# Patient Record
Sex: MALE | Race: OTHER
[De-identification: names, ages, dates, MRNs, and addresses within clinical notes are randomized per-mention and may not be internally consistent; named-entity substitution may affect disease eponyms.]

---

## 2020-10-18 ENCOUNTER — HOSPITAL ENCOUNTER (INPATIENT)
Dept: HOSPITAL 72 - EMR | Age: 35
LOS: 2 days | Discharge: NURSING, REHAB OR LONG-TERM CARE | DRG: 137 | End: 2020-10-20
Payer: MEDICAID

## 2020-10-18 VITALS — DIASTOLIC BLOOD PRESSURE: 90 MMHG | SYSTOLIC BLOOD PRESSURE: 140 MMHG

## 2020-10-18 VITALS — SYSTOLIC BLOOD PRESSURE: 143 MMHG | DIASTOLIC BLOOD PRESSURE: 79 MMHG

## 2020-10-18 VITALS — DIASTOLIC BLOOD PRESSURE: 88 MMHG | SYSTOLIC BLOOD PRESSURE: 142 MMHG

## 2020-10-18 VITALS — WEIGHT: 193.79 LBS | BODY MASS INDEX: 31.14 KG/M2 | HEIGHT: 66 IN

## 2020-10-18 VITALS — DIASTOLIC BLOOD PRESSURE: 85 MMHG | SYSTOLIC BLOOD PRESSURE: 133 MMHG

## 2020-10-18 VITALS — DIASTOLIC BLOOD PRESSURE: 89 MMHG | SYSTOLIC BLOOD PRESSURE: 142 MMHG

## 2020-10-18 DIAGNOSIS — U07.1: Primary | ICD-10-CM

## 2020-10-18 DIAGNOSIS — J12.89: ICD-10-CM

## 2020-10-18 DIAGNOSIS — E11.9: ICD-10-CM

## 2020-10-18 DIAGNOSIS — R74.01: ICD-10-CM

## 2020-10-18 DIAGNOSIS — D72.810: ICD-10-CM

## 2020-10-18 DIAGNOSIS — K21.9: ICD-10-CM

## 2020-10-18 DIAGNOSIS — K76.0: ICD-10-CM

## 2020-10-18 LAB
ADD MANUAL DIFF: NO
ALBUMIN SERPL-MCNC: 2.5 G/DL (ref 3.4–5)
ALBUMIN/GLOB SERPL: 0.5 {RATIO} (ref 1–2.7)
ALP SERPL-CCNC: 220 U/L (ref 46–116)
ALT SERPL-CCNC: 204 U/L (ref 12–78)
ANION GAP SERPL CALC-SCNC: 7 MMOL/L (ref 5–15)
APTT BLD: 28 SEC (ref 23–33)
AST SERPL-CCNC: 272 U/L (ref 15–37)
BASOPHILS NFR BLD AUTO: 1.1 % (ref 0–2)
BILIRUB DIRECT SERPL-MCNC: 0.5 MG/DL (ref 0–0.3)
BILIRUB SERPL-MCNC: 1.4 MG/DL (ref 0.2–1)
BUN SERPL-MCNC: 6 MG/DL (ref 7–18)
CALCIUM SERPL-MCNC: 7.7 MG/DL (ref 8.5–10.1)
CHLORIDE SERPL-SCNC: 104 MMOL/L (ref 98–107)
CO2 SERPL-SCNC: 24 MMOL/L (ref 21–32)
CREAT SERPL-MCNC: 0.8 MG/DL (ref 0.55–1.3)
EOSINOPHIL NFR BLD AUTO: 0.4 % (ref 0–3)
ERYTHROCYTE [DISTWIDTH] IN BLOOD BY AUTOMATED COUNT: 12.6 % (ref 11.6–14.8)
FERRITIN SERPL-MCNC: 420 NG/ML (ref 8–388)
GLOBULIN SER-MCNC: 5.1 G/DL
HCT VFR BLD CALC: 42.1 % (ref 42–52)
HGB BLD-MCNC: 15.2 G/DL (ref 14.2–18)
INR PPP: 1.3 (ref 0.9–1.1)
LDH SERPL L TO P-CCNC: 699 U/L (ref 81–234)
LYMPHOCYTES NFR BLD AUTO: 17.7 % (ref 20–45)
MCV RBC AUTO: 95 FL (ref 80–99)
MONOCYTES NFR BLD AUTO: 9.8 % (ref 1–10)
NEUTROPHILS NFR BLD AUTO: 71 % (ref 45–75)
PLATELET # BLD: 121 K/UL (ref 150–450)
POTASSIUM SERPL-SCNC: 3.7 MMOL/L (ref 3.5–5.1)
RBC # BLD AUTO: 4.45 M/UL (ref 4.7–6.1)
SODIUM SERPL-SCNC: 135 MMOL/L (ref 136–145)
WBC # BLD AUTO: 10.7 K/UL (ref 4.8–10.8)

## 2020-10-18 PROCEDURE — 85379 FIBRIN DEGRADATION QUANT: CPT

## 2020-10-18 PROCEDURE — 87040 BLOOD CULTURE FOR BACTERIA: CPT

## 2020-10-18 PROCEDURE — 83036 HEMOGLOBIN GLYCOSYLATED A1C: CPT

## 2020-10-18 PROCEDURE — 86803 HEPATITIS C AB TEST: CPT

## 2020-10-18 PROCEDURE — 36415 COLL VENOUS BLD VENIPUNCTURE: CPT

## 2020-10-18 PROCEDURE — 86709 HEPATITIS A IGM ANTIBODY: CPT

## 2020-10-18 PROCEDURE — 80053 COMPREHEN METABOLIC PANEL: CPT

## 2020-10-18 PROCEDURE — 82728 ASSAY OF FERRITIN: CPT

## 2020-10-18 PROCEDURE — 83605 ASSAY OF LACTIC ACID: CPT

## 2020-10-18 PROCEDURE — 85025 COMPLETE CBC W/AUTO DIFF WBC: CPT

## 2020-10-18 PROCEDURE — 99285 EMERGENCY DEPT VISIT HI MDM: CPT

## 2020-10-18 PROCEDURE — 87340 HEPATITIS B SURFACE AG IA: CPT

## 2020-10-18 PROCEDURE — 83615 LACTATE (LD) (LDH) ENZYME: CPT

## 2020-10-18 PROCEDURE — 82248 BILIRUBIN DIRECT: CPT

## 2020-10-18 PROCEDURE — 86140 C-REACTIVE PROTEIN: CPT

## 2020-10-18 PROCEDURE — 85730 THROMBOPLASTIN TIME PARTIAL: CPT

## 2020-10-18 PROCEDURE — 83690 ASSAY OF LIPASE: CPT

## 2020-10-18 PROCEDURE — 85610 PROTHROMBIN TIME: CPT

## 2020-10-18 PROCEDURE — 86705 HEP B CORE ANTIBODY IGM: CPT

## 2020-10-18 PROCEDURE — 71045 X-RAY EXAM CHEST 1 VIEW: CPT

## 2020-10-18 PROCEDURE — 96368 THER/DIAG CONCURRENT INF: CPT

## 2020-10-18 PROCEDURE — 96372 THER/PROPH/DIAG INJ SC/IM: CPT

## 2020-10-18 PROCEDURE — 76700 US EXAM ABDOM COMPLETE: CPT

## 2020-10-18 PROCEDURE — 96365 THER/PROPH/DIAG IV INF INIT: CPT

## 2020-10-18 PROCEDURE — 80076 HEPATIC FUNCTION PANEL: CPT

## 2020-10-18 PROCEDURE — 96375 TX/PRO/DX INJ NEW DRUG ADDON: CPT

## 2020-10-18 PROCEDURE — 96361 HYDRATE IV INFUSION ADD-ON: CPT

## 2020-10-18 NOTE — EMERGENCY ROOM REPORT
History of Present Illness


General


Chief Complaint:  Upper Respiratory Illness


Source:  Patient





Present Illness


HPI


35-year-old male presents the ED for evaluation.  States that he has been 

coughing for the last 2 weeks.  States he got tested positive for COVID a few 

days ago.  States he has been since then having increased body aches and cough. 

Vomited once.  Denies shortness of breath.  Denies chest pain.  No other 

aggravating relieving factors.  Denies any other associated symptoms


Allergies:  


Coded Allergies:  


     No Known Allergies (Unverified , 10/18/20)





COVID-19 Screening


Contact w/high risk pt:  Yes


Experienced COVID-19 symptoms?:  Yes


COVID-19 Testing performed PTA:  Yes


COVID-19 Screening:  Positive COVID-19


COVID-19 Testing Source:  nasal





Patient History


Past Medical History:  DM


Past Surgical History:  none


Pertinent Family History:  none


Social History:  Denies: smoking, alcohol use, drug use


Immunizations:  UTD


Reviewed Nursing Documentation:  PMH: Agreed; PSxH: Agreed





Nursing Documentation-PMH


Hx Diabetes:  Yes





Review of Systems


All Other Systems:  negative except mentioned in HPI





Physical Exam





Vital Signs








  Date Time  Temp Pulse Resp B/P (MAP) Pulse Ox O2 Delivery O2 Flow Rate FiO2


 


10/18/20 12:13 98.2 94 18 139/70 (93) 98 Room Air  








Sp02 EP Interpretation:  reviewed, normal


General Appearance:  no apparent distress, alert, GCS 15, non-toxic


Head:  normocephalic, atraumatic


Eyes:  bilateral eye normal inspection, bilateral eye PERRL


ENT:  hearing grossly normal, normal pharynx, no angioedema, normal voice


Neck:  full range of motion, supple/symm/no masses


Respiratory:  chest non-tender, normal breath sounds, decreased breath sounds, 

crackles, speaking full sentences


Cardiovascular #1:  regular rate, rhythm, no edema


Cardiovascular #2:  2+ carotid (R), 2+ carotid (L), 2+ radial (R), 2+ radial 

(L), 2+ dorsalis pedis (R), 2+ dorsalis pedis (L)


Gastrointestinal:  normal bowel sounds, non tender, soft, non-distended, no 

guarding, no rebound


Rectal:  deferred


Genitourinary:  normal inspection, no CVA tenderness


Musculoskeletal:  back normal, normal range of motion, gait/station normal, non-

tender


Neurologic:  alert, motor strength/tone normal, oriented x3, sensory intact, 

responsive, speech normal


Psychiatric:  judgement/insight normal, memory normal, mood/affect normal, no 

suicidal/homicidal ideation


Reflexes:  3+ bicep (R), 3+ bicep (L), 3+ tricep (R), 3+ tricep (L), 3+ knee 

(R), 3+ knee (L)


Skin:  no rash


Lymphatic:  no adenopathy





Medical Decision Making


Diagnostic Impression:  


   Primary Impression:  


   Pneumonia due to COVID-19 virus


ER Course


Hospital Course 


35-year-old male presents with cough, vomiting.  Recent COVID diagnosis





Differential diagnoses include: Pneumonia, CHF exacerbation, pneumothorax, fluid

overload





Clinical course


Patient placed on stretcher.  Isolation.  I wore full PPE.  On cardiac monitor 

with stable vitals.  After initial history and physical, I ordered labs, IV 

fluids, 





Labs - no leukocytosis, hemoglobin/hematocrit stable, electrolytes okay, LFTs 

elevated


CXR -acute bilateral opacities consistent with COVID pneumonia





Inflammatory markers pending.  D-dimer elevated





Given antibiotics.  Given Lovenox.





Case discussed with Dr. Gutierrez and he agreed to the patient to his service for 

further care and support





I feel this is a highly complex case requiring extensive working including 

EKG/Rhythm strip, Xray/CT/US, Blood/urine lab work, repeat exams while in ED, 

and administration of strong opiates/narcotics for pain control, admission to 

hospital or close patient follow up.  





Diagnosis - COVID pneumonia





Patient admitted to floor in serious condition





Laboratory Tests








Test


 10/18/20


12:11


 


White Blood Count


 10.7 K/UL


(4.8-10.8)


 


Red Blood Count


 4.45 M/UL


(4.70-6.10)  L


 


Hemoglobin


 15.2 G/DL


(14.2-18.0)


 


Hematocrit


 42.1 %


(42.0-52.0)


 


Mean Corpuscular Volume 95 FL (80-99)  


 


Mean Corpuscular Hemoglobin


 34.1 PG


(27.0-31.0)  H


 


Mean Corpuscular Hemoglobin


Concent 36.0 G/DL


(32.0-36.0)


 


Red Cell Distribution Width


 12.6 %


(11.6-14.8)


 


Platelet Count


 121 K/UL


(150-450)  L


 


Mean Platelet Volume


 10.5 FL


(6.5-10.1)  H


 


Neutrophils (%) (Auto)


 71.0 %


(45.0-75.0)


 


Lymphocytes (%) (Auto)


 17.7 %


(20.0-45.0)  L


 


Monocytes (%) (Auto)


 9.8 %


(1.0-10.0)


 


Eosinophils (%) (Auto)


 0.4 %


(0.0-3.0)


 


Basophils (%) (Auto)


 1.1 %


(0.0-2.0)


 


Sodium Level


 135 MMOL/L


(136-145)  L


 


Potassium Level


 3.7 MMOL/L


(3.5-5.1)


 


Chloride Level


 104 MMOL/L


()


 


Carbon Dioxide Level


 24 MMOL/L


(21-32)


 


Anion Gap


 7 mmol/L


(5-15)


 


Blood Urea Nitrogen


 6 mg/dL (7-18)


L


 


Creatinine


 0.8 MG/DL


(0.55-1.30)


 


Estimat Glomerular Filtration


Rate > 60 mL/min


(>60)


 


Glucose Level


 221 MG/DL


()  H


 


Calcium Level


 7.7 MG/DL


(8.5-10.1)  L


 


Total Bilirubin


 1.4 MG/DL


(0.2-1.0)  H


 


Direct Bilirubin


 0.5 MG/DL


(0.0-0.3)  H


 


Aspartate Amino Transf


(AST/SGOT) 272 U/L


(15-37)  H


 


Alanine Aminotransferase


(ALT/SGPT) 204 U/L


(12-78)  H


 


Alkaline Phosphatase


 220 U/L


()  H


 


Total Protein


 7.6 G/DL


(6.4-8.2)


 


Albumin


 2.5 G/DL


(3.4-5.0)  L


 


Globulin 5.1 g/dL  


 


Albumin/Globulin Ratio


 0.5 (1.0-2.7)


L


 


Lipase


 376 U/L


()








Chest X-Ray Diagnostic Results


Chest X-Ray Diagnostic Results :  


   Chest X-Ray Ordered:  Yes


   # of Views/Limited/Complete:  1 View


   EP Interpretation:  Yes


   Interpretation:  no effusion, no pneumothorax, other - Lateral patchy 

opacities


   Impression:  Other - COVID pneumonia


   Electronically Signed by:  Electronically signed by Joao Brady MD





Last Vital Signs








  Date Time  Temp Pulse Resp B/P (MAP) Pulse Ox O2 Delivery O2 Flow Rate FiO2


 


10/18/20 13:10  76 18   Room Air  


 


10/18/20 13:09 98.3   133/85 96   








Status:  improved


Disposition:  ADMITTED AS INPATIENT


Condition:  Serious


Scripts


No Active Prescriptions or Reported Meds


Referrals:  


NOT CHOSEN [facility]/MD,REFERRING (PCP)











Joao Brady MD            Oct 18, 2020 13:50

## 2020-10-18 NOTE — DIAGNOSTIC IMAGING REPORT
EXAM:

  XR Chest, 1 View

 

CLINICAL HISTORY:

  ABD PAIN

 

TECHNIQUE:

  Frontal view of the chest.

 

COMPARISON:

  No relevant prior studies available.

 

FINDINGS:

  Lungs:  Patchy bilateral airspace opacities, worse in the right lung.

  Pleural space:  Unremarkable.  No pneumothorax.

  Heart:  Prominent cardiac silhouette.

  Mediastinum:  Unremarkable.

  Bones/joints:  Unremarkable.

 

IMPRESSION:     

  Patchy bilateral airspace opacities, worse in the right lung.  

Worrisome for pneumonia, consider COVID.

## 2020-10-19 VITALS — DIASTOLIC BLOOD PRESSURE: 85 MMHG | SYSTOLIC BLOOD PRESSURE: 139 MMHG

## 2020-10-19 VITALS — DIASTOLIC BLOOD PRESSURE: 81 MMHG | SYSTOLIC BLOOD PRESSURE: 132 MMHG

## 2020-10-19 VITALS — DIASTOLIC BLOOD PRESSURE: 94 MMHG | SYSTOLIC BLOOD PRESSURE: 155 MMHG

## 2020-10-19 VITALS — SYSTOLIC BLOOD PRESSURE: 153 MMHG | DIASTOLIC BLOOD PRESSURE: 86 MMHG

## 2020-10-19 VITALS — DIASTOLIC BLOOD PRESSURE: 82 MMHG | SYSTOLIC BLOOD PRESSURE: 148 MMHG

## 2020-10-19 VITALS — DIASTOLIC BLOOD PRESSURE: 87 MMHG | SYSTOLIC BLOOD PRESSURE: 129 MMHG

## 2020-10-19 LAB
ADD MANUAL DIFF: NO
ALBUMIN SERPL-MCNC: 2 G/DL (ref 3.4–5)
ALBUMIN/GLOB SERPL: 0.5 {RATIO} (ref 1–2.7)
ALP SERPL-CCNC: 174 U/L (ref 46–116)
ALT SERPL-CCNC: 140 U/L (ref 12–78)
ANION GAP SERPL CALC-SCNC: 7 MMOL/L (ref 5–15)
AST SERPL-CCNC: 159 U/L (ref 15–37)
BASOPHILS NFR BLD AUTO: 1.4 % (ref 0–2)
BILIRUB DIRECT SERPL-MCNC: 0.5 MG/DL (ref 0–0.3)
BILIRUB SERPL-MCNC: 1.2 MG/DL (ref 0.2–1)
BUN SERPL-MCNC: 9 MG/DL (ref 7–18)
CALCIUM SERPL-MCNC: 7.8 MG/DL (ref 8.5–10.1)
CHLORIDE SERPL-SCNC: 104 MMOL/L (ref 98–107)
CO2 SERPL-SCNC: 24 MMOL/L (ref 21–32)
CREAT SERPL-MCNC: 0.9 MG/DL (ref 0.55–1.3)
EOSINOPHIL NFR BLD AUTO: 0 % (ref 0–3)
ERYTHROCYTE [DISTWIDTH] IN BLOOD BY AUTOMATED COUNT: 12.6 % (ref 11.6–14.8)
GLOBULIN SER-MCNC: 4 G/DL
HCT VFR BLD CALC: 40.3 % (ref 42–52)
HGB BLD-MCNC: 14.3 G/DL (ref 14.2–18)
LYMPHOCYTES NFR BLD AUTO: 9.8 % (ref 20–45)
MCV RBC AUTO: 95 FL (ref 80–99)
MONOCYTES NFR BLD AUTO: 6.7 % (ref 1–10)
NEUTROPHILS NFR BLD AUTO: 82.1 % (ref 45–75)
PLATELET # BLD: 127 K/UL (ref 150–450)
POTASSIUM SERPL-SCNC: 4.2 MMOL/L (ref 3.5–5.1)
RBC # BLD AUTO: 4.26 M/UL (ref 4.7–6.1)
SODIUM SERPL-SCNC: 135 MMOL/L (ref 136–145)
WBC # BLD AUTO: 7.1 K/UL (ref 4.8–10.8)

## 2020-10-19 NOTE — CONSULTATION
DATE OF CONSULTATION:  10/19/2020

INFECTIOUS DISEASES CONSULTATION



CONSULTING PHYSICIAN:  Wilfrido Valdez MD



PRIMARY ATTENDING PHYSICIAN:  Chele Gutierrez MD



REASON FOR CONSULTATION:  COVID-19 pneumonia.



HISTORY OF PRESENT ILLNESS:  The patient is a 35-year-old  male

admitted yesterday because of coughing, body ache.  Symptoms started one

week ago, was diagnosed with COVID-19 as outpatient but the symptoms

become worse and the patient came to the hospital.



PAST MEDICAL HISTORY:  Diabetes mellitus.



ALLERGIES:  No known drug allergies.



MEDICATIONS:  Dexamethasone, Zofran, sodium chloride, Tylenol.



SOCIAL HISTORY:  .  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

Denies drug abuse.  Denies smoking. Drinks occasionally.



REVIEW OF SYSTEMS:  No fever.  No chills.  Has cough that occasionally is

productive.  No chest pain.  No nausea.  No vomiting.  No shortness of

breath.  No dysuria.



PHYSICAL EXAMINATION:

VITAL SIGNS:  Temperature 97.5, pulse 80, blood pressure 139/85.

GENERAL APPEARANCE:  Seems to be overweight.

HEAD AND NECK:  Pink conjunctiva.

HEART:  Normal rate.

LUNGS:  Clear.

ABDOMEN:  Soft and nontender.

EXTREMITIES:  No edema.

NEUROLOGIC:  Awake, alert, oriented x3.



LABORATORY AND DIAGNOSTIC DATA:  WBC 7.1, hemoglobin 14.3, hematocrit 40.3,

platelets 127.  Lymphocyte count is 9.8.  Sodium 135, potassium 4.2,

chloride 104, bicarb 24, BUN 9, creatinine 0.9, glucose is 249.  Bilirubin

is 1.2, , , alkaline phosphatase 74.  Albumin is 2.

COVID-19 test positive.  Chest x-ray, patchy bilateral airspace opacities

worse in the right lung.



IMPRESSION:  COVID-19 pneumonia.  The patient currently is on room air and

oxygen and has good O2 saturation, has lymphopenia, elevated transaminase,

and diabetes mellitus.



RECOMMENDATION:  I agree with continuing of dexamethasone.  The patient is

not eligible for treatment with Remdesivir, needs better control of

diabetes mellitus.



At the end of my exam, I thank Dr. Gutierrez, for involving me in the care

of this patient.









  ______________________________________________

  Wilfrido Valdez M.D.





DR:  Rebekah

D:  10/19/2020 13:02

T:  10/19/2020 13:39

JOB#:  [id number]

CC:

## 2020-10-19 NOTE — GENERAL PROGRESS NOTE
Subjective


Allergies:  


Coded Allergies:  


     No Known Allergies (Unverified , 10/18/20)





Objective





Last 24 Hour Vital Signs








  Date Time  Temp Pulse Resp B/P (MAP) Pulse Ox O2 Delivery O2 Flow Rate FiO2


 


10/19/20 20:45      Room Air  


 


10/19/20 20:22 96.8 61 16 153/86 (108) 95   


 


10/19/20 16:00 97.5 71 20 129/87 (101) 94   


 


10/19/20 11:41 97.5 80 20 139/85 (103) 94   


 


10/19/20 09:00      Room Air  


 


10/19/20 08:00 97.7 71 18 132/81 (98) 94   


 


10/19/20 04:00 98.6 78 20 148/82 (104) 94   


 


10/19/20 00:00 97.9 76 20 155/94 (114) 94   

















Intake and Output  


 


 10/18/20 10/19/20





 19:00 07:00


 


  


 


# Voids 2 








Laboratory Tests


10/19/20 05:00: 


White Blood Count 7.1, Red Blood Count 4.26L, Hemoglobin 14.3, Hematocrit 40.3L,

Mean Corpuscular Volume 95, Mean Corpuscular Hemoglobin 33.6H, Mean Corpuscular 

Hemoglobin Concent 35.5, Red Cell Distribution Width 12.6, Platelet Count 127L, 

Mean Platelet Volume 12.9H, Neutrophils (%) (Auto) 82.1H, Lymphocytes (%) (Auto)

9.8L, Monocytes (%) (Auto) 6.7, Eosinophils (%) (Auto) 0.0, Basophils (%) (Auto)

1.4, Sodium Level 135L, Potassium Level 4.2, Chloride Level 104, Carbon Dioxide 

Level 24, Anion Gap 7, Blood Urea Nitrogen 9, Creatinine 0.9, Estimat Glomerular

Filtration Rate > 60, Glucose Level 249H, Calcium Level 7.8L, Total Bilirubin 

1.2H, Direct Bilirubin 0.5H, Aspartate Amino Transf (AST/SGOT) 159H, Alanine 

Aminotransferase (ALT/SGPT) 140H, Alkaline Phosphatase 174H, Total Protein 6.0L,

Albumin 2.0L, Globulin 4.0, Albumin/Globulin Ratio 0.5L


Height (Feet):  5


Height (Inches):  6.00


Weight (Pounds):  180





Assessment/Plan


Assessment/Plan:


Assessment


- COVID PNA


- Abnormal LFT, now lower, likely due to COVID





Recommendations


- follow LFT


- check hepatitis serologies


- steroids and Abx per ID





Thank you


MD Patricia Montanez Payman MD             Oct 19, 2020 22:54

## 2020-10-19 NOTE — CONSULTATION
DATE OF CONSULTATION:  10/19/2020

PULMONARY CONSULTATION



HISTORY OF PRESENT ILLNESS:  This is a 35-year-old male who is known to be

COVID positive tested as an outpatient several days ago.  He presented to

the hospital with cough, body aches and shortness of breath.  He also had

emesis.



REVIEW OF SYSTEMS:  Denies any headaches, hematemesis, melena,

hematochezia, night sweats, or weight loss.



PAST SURGICAL HISTORY:  None.



PAST MEDICAL HISTORY:  None.



SOCIAL HISTORY:  Denies alcohol or tobacco usage.



PHYSICAL EXAMINATION:

GENERAL:  Reveals a 35-year-old male.

VITAL SIGNS:  Blood pressure 130/80, heart rate 74, respiratory rate 18, O2

saturation 94% on room air.

HEENT:  Unremarkable.

CHEST:  Clear breath sounds bilaterally.

ABDOMEN:  Soft.

EXTREMITIES:  There is no edema.

NEUROLOGIC:  Nonfocal.



LABORATORY DATA:  Rapid COVID testing is positive.



IMAGING STUDIES:  X-ray chest was obtained yesterday which shows patchy

bilateral airspace disease.



IMPRESSION:  _____ COVID-19 pneumonia.



DISCUSSION:  Agree with admission and care.  The patient is a candidate for

Decadron. He received a single does yesterday.  We will start him on oral

Decadron today.  Defer choice of Remdesivir and/or steroids to ID.  We

will follow carefully.









  ______________________________________________

  Edgar Fuller M.D.





DR:  Angelika

D:  10/19/2020 10:27

T:  10/19/2020 10:58

JOB#:  [id number]

CC:

## 2020-10-19 NOTE — HISTORY AND PHYSICAL REPORT
DATE OF ADMISSION:  10/18/2020

HISTORY OF PRESENT ILLNESS:  The patient denies shortness of breath.

Denies chest pain.  Denies nausea, vomiting, diarrhea, fever, chills, is

admitted for COVID positive pneumonia and elevated LFTs.  The patient

feels weak.  Denies nausea, vomiting, or diarrhea.



PAST MEDICAL HISTORY:  None other than GERD.



PAST SURGICAL HISTORY:  None.



ALLERGIES:  No known allergies.



MEDICATIONS:  None.



FAMILY HISTORY:  Noncontributory.



SOCIAL HISTORY:  Denies history of smoking.  No history of alcohol or

illicit drugs.



REVIEW OF SYSTEMS:  HEENT:  Denies headache.    RESPIRATORY:  Denies

shortness of breath.  Does have cough.    CARDIOVASCULAR:  Denies chest

pain or orthopnea.    GASTROINTESTINAL:  Denies nausea, vomiting, or

diarrhea.    EXTREMITIES:  Denies pain.  CENTRAL NERVOUS SYSTEM:  Denies

change in speech pattern.



PHYSICAL EXAMINATION:

VITAL SIGNS:  Temperature is 98.6, pulse is 78, blood pressure 148/82.

HEENT:  PERRLA.

NECK:  Supple.  No lymphadenopathy.

CHEST:  Clear to auscultation.

CARDIOVASCULAR:  Regular rate and rhythm.  No murmurs or extra sounds.

GASTROINTESTINAL:  Soft, nontender, nondistended.  No organomegaly.

EXTREMITIES:  No edema.  Moves all four extremities.

NEUROLOGIC:  Sensory intact to light touch.  Reflexes equal on both sides.

Moves all four extremities.



ASSESSMENT/PLAN:  COVID positive pneumonia, elevated LFTs, _____ p.r.n.,

oxygen if needed.  I have asked Dr. Edgar Fuller, Dr. Wilfrido Valdez, Dr. Gomez see the patient for the above-mentioned abnormalities and

antibiotics if any per Dr. Wilfrido Valdez.









  ______________________________________________

  Chele Gutierrez M.D. DR:  HUSEYIN

D:  10/19/2020 20:38

T:  10/19/2020 23:15

JOB#:  [id number]

CC:

## 2020-10-20 VITALS — DIASTOLIC BLOOD PRESSURE: 83 MMHG | SYSTOLIC BLOOD PRESSURE: 136 MMHG

## 2020-10-20 VITALS — SYSTOLIC BLOOD PRESSURE: 140 MMHG | DIASTOLIC BLOOD PRESSURE: 81 MMHG

## 2020-10-20 VITALS — DIASTOLIC BLOOD PRESSURE: 79 MMHG | SYSTOLIC BLOOD PRESSURE: 146 MMHG

## 2020-10-20 VITALS — DIASTOLIC BLOOD PRESSURE: 86 MMHG | SYSTOLIC BLOOD PRESSURE: 146 MMHG

## 2020-10-20 LAB
ALBUMIN SERPL-MCNC: 2.2 G/DL (ref 3.4–5)
ALP SERPL-CCNC: 188 U/L (ref 46–116)
ALT SERPL-CCNC: 127 U/L (ref 12–78)
AST SERPL-CCNC: 94 U/L (ref 15–37)
BILIRUB DIRECT SERPL-MCNC: 0.4 MG/DL (ref 0–0.3)
BILIRUB SERPL-MCNC: 1 MG/DL (ref 0.2–1)

## 2020-10-20 NOTE — CONSULTATION
DATE OF CONSULTATION:  10/19/2020

GASTROENTEROLOGY CONSULTATION REPORT



CONSULTING PHYSICIAN:  Doreen Gomez MD



CHIEF COMPLAINT:  I was asked to see this patient for abnormal liver

tests.



HISTORY OF PRESENT ILLNESS:  Patient is a 35-year-old man who is admitted

with COVID positive pneumonia.  He is in respiratory isolation.  His liver

tests have been elevated.  He denies any previous history of liver disease

or alcoholism or hepatitis.  He has been feeling better and is coughing,

but is not very short of breath.  He also had some _______ prior to

admission, which is resolved.



PAST MEDICAL HISTORY:  None.



FAMILY HISTORY:  Noncontributory.



PAST SURGICAL HISTORY:  None.



SOCIAL HISTORY:  Patient denies smoking or drinking.



PHYSICAL EXAMINATION:

GENERAL:  Well-developed, well-nourished man, in no distress.

HEENT:  Normocephalic and atraumatic.

NECK:  Supple.

CHEST:  Reveals scattered rhonchi.

CARDIOVASCULAR:  Revealed a regular rate.

ABDOMEN:  Soft, nontender.

EXTREMITIES:  Revealed no edema.



LABORATORY DATA:  Noted.



ASSESSMENT:  This patient presents with COVID pneumonia and abnormal liver

tests.  The value is typically found in patients with this type of viral

infection and the process resolves uneventfully with conservative

management.  Patient can undergo hepatitis serology testing as well as

imaging studies of his abdomen.  However, at this point, he is

asymptomatic and his liver tests are likely to resolve and therefore he

should be followed conservatively.



RECOMMENDATIONS:

1. Follow liver tests until resolution.

2. Check hepatitis serologies.

3. Liver imaging with ultrasound.



Thank you for asking me to participate in the care of this patient.









  ______________________________________________

  Doreen Gomez M.D. DR:  JACOB

D:  10/20/2020 20:45

T:  10/20/2020 21:42

JOB#:  [id number]

CC:

## 2020-10-20 NOTE — GENERAL PROGRESS NOTE
Subjective


ROS Limited/Unobtainable:  Yes


Allergies:  


Coded Allergies:  


     No Known Allergies (Unverified , 10/18/20)





Objective





Last 24 Hour Vital Signs








  Date Time  Temp Pulse Resp B/P (MAP) Pulse Ox O2 Delivery O2 Flow Rate FiO2


 


10/20/20 11:52 98.6 65 18 140/81 (100) 96   


 


10/20/20 09:00      Room Air  


 


10/20/20 08:00 97.3 64 18 146/79 (101) 95   


 


10/20/20 03:57 97.2 73 18 136/83 (100) 96   


 


10/19/20 20:45      Room Air  


 


10/19/20 20:22 96.8 61 16 153/86 (108) 95   


 


10/19/20 16:00 97.5 71 20 129/87 (101) 94   

















Intake and Output  


 


 10/19/20 10/20/20





 19:00 07:00


 


Intake Total 960 ml 1140 ml


 


Balance 960 ml 1140 ml


 


  


 


Intake Oral 240 ml 420 ml


 


IV Total 720 ml 720 ml


 


# Voids  4








Laboratory Tests


10/20/20 05:20: 


Hemoglobin A1c 6.6H, Total Bilirubin 1.0, Direct Bilirubin 0.4H, Aspartate Amino

Transf (AST/SGOT) 94H, Alanine Aminotransferase (ALT/SGPT) 127H, Alkaline 

Phosphatase 188H, Total Protein 6.7, Albumin 2.2L, Hepatitis A IgM Antibody 

[Pending], Hepatitis B Surface Antigen [Pending], Hepatitis B Core IgM Antibody 

[Pending], Hepatitis C Antibody [Pending]


Height (Feet):  5


Height (Inches):  6.00


Weight (Pounds):  180





Assessment/Plan


Problem List:  


(1) Pneumonia due to COVID-19 virus


ICD Codes:  U07.1 - COVID-19; J12.89 - Other viral pneumonia


SNOMED:  [id number]


(2) Pneumonia


ICD Codes:  J18.9 - Pneumonia, unspecified organism


SNOMED:  627517796


(3) COVID-19


ICD Codes:  U07.1 - COVID-19


SNOMED:  246902679


Status:  stable


Assessment/Plan:


afebrile


nac


covid pna


transfer to [facility] 


needs quarrantive fro 14 days


dr barrera agrees with [facility]











Chele Gutierrez MD                  Oct 20, 2020 15:25

## 2020-10-20 NOTE — PULMONOLOGY PROGRESS NOTE
Subjective


Interval Events:  None new


Constitutional:  Reports: no symptoms


HEENT:  Repors: no symptoms


Respiratory:  Reports: no symptoms


Cardiovascular:  Reports: no symptoms


Gastrointestinal/Abdominal:  Reports: no symptoms


Allergies:  


Coded Allergies:  


     No Known Allergies (Unverified , 10/18/20)





Objective





Last 24 Hour Vital Signs








  Date Time  Temp Pulse Resp B/P (MAP) Pulse Ox O2 Delivery O2 Flow Rate FiO2


 


10/20/20 03:57 97.2 73 18 136/83 (100) 96   


 


10/19/20 20:45      Room Air  


 


10/19/20 20:22 96.8 61 16 153/86 (108) 95   


 


10/19/20 16:00 97.5 71 20 129/87 (101) 94   


 


10/19/20 11:41 97.5 80 20 139/85 (103) 94   

















Intake and Output  


 


 10/19/20 10/20/20





 19:00 07:00


 


Intake Total 960 ml 1140 ml


 


Balance 960 ml 1140 ml


 


  


 


Intake Oral 240 ml 420 ml


 


IV Total 720 ml 720 ml


 


# Voids  4








General Appearance:  no acute distress


HEENT:  normocephalic


Respiratory:  chest wall non-tender, lungs clear


Cardiovascular:  normal peripheral pulses


Abdomen:  normal bowel sounds





Microbiology








 Date/Time


Source Procedure


Growth Status





 


 10/18/20 14:25


Nasopharynx SARS-CoV-2 RdRp Gene Assay - Final Complete


 


 10/18/20 14:25


Blood Blood Culture - Preliminary


NO GROWTH AFTER 24 HOURS Resulted


 


 10/18/20 14:25


Blood Blood Culture - Preliminary


NO GROWTH AFTER 24 HOURS Resulted








Laboratory Tests


10/20/20 05:20: 


Hemoglobin A1c 6.6H, Total Bilirubin 1.0, Direct Bilirubin 0.4H, Aspartate Amino

Transf (AST/SGOT) 94H, Alanine Aminotransferase (ALT/SGPT) 127H, Alkaline 

Phosphatase 188H, Total Protein 6.7, Albumin 2.2L, Hepatitis A IgM Antibody 

[Pending], Hepatitis B Surface Antigen [Pending], Hepatitis B Core IgM Antibody 

[Pending], Hepatitis C Antibody [Pending]





Current Medications








 Medications


  (Trade)  Dose


 Ordered  Sig/Mana


 Route


 PRN Reason  Start Time


 Stop Time Status Last Admin


Dose Admin


 


 Acetaminophen


  (Tylenol)  500 mg  Q6H  PRN


 ORAL


 For Pain  10/18/20 17:00


 11/17/20 16:59   





 


 Acetaminophen


  (Tylenol)  500 mg  Q6H  PRN


 ORAL


 Temp >100.5  10/18/20 17:00


 11/17/20 16:59   





 


 Dexamethasone


  (Decadron)  10 mg  DAILY


 ORAL


   10/19/20 11:00


 11/18/20 10:59  10/20/20 08:46





 


 Ondansetron HCl


  (Zofran)  4 mg  Q6H  PRN


 IVP


 Nausea & Vomiting  10/18/20 17:00


 11/17/20 16:59   





 


 Sodium Chloride  1,000 ml @ 


 60 mls/hr  [id number]


 IV


   10/18/20 17:00


 11/17/20 16:59  10/20/20 01:26














Assessment/Plan


Assessment/Plan


IMPRESSION:  COVID-19 pneumonia.





DISCUSSION:  





Continue steroids


Agree with admission and care. Defer choice of Remdesivir  to ID.  


I will follow carefully.














  ______________________________________________


  CHATO Garcia Omar Syed MD           Oct 20, 2020 09:51

## 2020-10-20 NOTE — DIAGNOSTIC IMAGING REPORT
ABDOMINAL ULTRASOUND - COMPLETE

 

INDICATION: Abdominal pain.

 

TECHNIQUE: Multiplanar ultrasound examination of the abdomen with greyscale and

doppler imaging. 

 

COMPARISON: None

 

FINDINGS:

 

Liver: The liver is normal in size and demonstrates diffusely increased echogenicity.

No focal abnormalities are noted.

 

Gallbladder: The gallbladder is contracted, limiting evaluation. There is no

sonographic Bagley sign.

 

Common bile duct: Normal in size.

 

Pancreas: The visualized portion of pancreas is normal in echogenicity. There are no

masses.

 

Kidneys: The kidneys are normal in size and echogenicity. There is no hydronephrosis.

 

Spleen: The spleen is normal in size and echogenicity.

 

Aorta: The aorta is normal in caliber.

 

IMPRESSION: 

 

1.  Hepatic steatosis.

2.  Limited evaluation of the gallbladder due to contraction.

## 2020-10-20 NOTE — INFECTIOUS DISEASES PROG NOTE
Assessment/Plan


Assessment/Plan


IMPRESSION:  


COVID-19 pneumonia.  


Lymphopenia, 


Elevated transaminase,


Diabetes mellitus.





RECOMMENDATION:  


I agree with discharge without antibiotic





Subjective


ROS Limited/Unobtainable:  Yes


Constitutional:  Reports: no symptoms, other - feels better


Respiratory:  Reports: dry cough; Denies: shortness of breath


Gastrointestinal/Abdominal:  Reports: no symptoms


Genitourinary:  Reports: no symptoms


Allergies:  


Coded Allergies:  


     No Known Allergies (Unverified , 10/18/20)





Objective





Last 24 Hour Vital Signs








  Date Time  Temp Pulse Resp B/P (MAP) Pulse Ox O2 Delivery O2 Flow Rate FiO2


 


10/20/20 11:52 98.6 65 18 140/81 (100) 96   


 


10/20/20 09:00      Room Air  


 


10/20/20 08:00 97.3 64 18 146/79 (101) 95   


 


10/20/20 03:57 97.2 73 18 136/83 (100) 96   


 


10/19/20 20:45      Room Air  


 


10/19/20 20:22 96.8 61 16 153/86 (108) 95   


 


10/19/20 16:00 97.5 71 20 129/87 (101) 94   








Height (Feet):  5


Height (Inches):  6.00


Weight (Pounds):  180


General Appearance:  no acute distress


HEENT:  mucous membranes moist


Respiratory/Chest:  lungs clear


Cardiovascular:  normal rate


Abdomen:  soft, non tender


Extremities:  no edema


Neurologic/Psychiatric:  alert, oriented x 3, responsive





Microbiology








 Date/Time


Source Procedure


Growth Status





 


 10/18/20 14:25


Nasopharynx SARS-CoV-2 RdRp Gene Assay - Final Complete


 


 10/18/20 14:25


Blood Blood Culture - Preliminary


NO GROWTH AFTER 24 HOURS Resulted


 


 10/18/20 14:25


Blood Blood Culture - Preliminary


NO GROWTH AFTER 24 HOURS Resulted











Laboratory Tests








Test


 10/20/20


05:20


 


Hemoglobin A1c


 6.6 %


(4.3-6.0)  H


 


Total Bilirubin


 1.0 MG/DL


(0.2-1.0)


 


Direct Bilirubin


 0.4 MG/DL


(0.0-0.3)  H


 


Aspartate Amino Transf


(AST/SGOT) 94 U/L (15-37)


H


 


Alanine Aminotransferase


(ALT/SGPT) 127 U/L


(12-78)  H


 


Alkaline Phosphatase


 188 U/L


()  H


 


Total Protein


 6.7 G/DL


(6.4-8.2)


 


Albumin


 2.2 G/DL


(3.4-5.0)  L


 


Hepatitis A IgM Antibody Pending  


 


Hepatitis B Surface Antigen Pending  


 


Hepatitis B Core IgM Antibody Pending  


 


Hepatitis C Antibody Pending  











Current Medications








 Medications


  (Trade)  Dose


 Ordered  Sig/Mana


 Route


 PRN Reason  Start Time


 Stop Time Status Last Admin


Dose Admin


 


 Acetaminophen


  (Tylenol)  500 mg  Q6H  PRN


 ORAL


 For Pain  10/18/20 17:00


 11/17/20 16:59   





 


 Acetaminophen


  (Tylenol)  500 mg  Q6H  PRN


 ORAL


 Temp >100.5  10/18/20 17:00


 11/17/20 16:59   





 


 Dexamethasone


  (Decadron)  10 mg  DAILY


 ORAL


   10/19/20 11:00


 11/18/20 10:59  10/20/20 08:46





 


 Ondansetron HCl


  (Zofran)  4 mg  Q6H  PRN


 IVP


 Nausea & Vomiting  10/18/20 17:00


 11/17/20 16:59   





 


 Sodium Chloride  1,000 ml @ 


 60 mls/hr  [id number]


 IV


   10/18/20 17:00


 11/17/20 16:59  10/20/20 01:26




















Wilfrido Valdez MD               Oct 20, 2020 12:17

## 2020-10-22 NOTE — DISCHARGE SUMMARY
Discharge Summary


Discharge Summary


_


 DATE OF ADMISSION: 10/18/2020





DATE OF DISCHARGE: 10/20/2020





DISCHARGE  BY Dr. Gutierrez





REASON FOR ADMISSION: 


35 years old male with past medical history of diabetes mellitus, presented  

with  chief complaint of cough for the last 2 weeks.


Per patient, he tested positive for Covid 19 few days ago.


Patient reported generalized body aches and cough.  


Patient reported non bloody emesis x1.  


He denied chest pain  and  shortness of breath.  


Upon evaluation vital signs were stable.  Pulse oximetry was stable on room air.

 


Rapid COVID-19 in emergency department was positive.  


Chest x-ray revealed patchy bilateral airspace opacity,  worse in the right 

lung. 


Laboratory work-up revealed no leukocytosis , stable hemoglobin, hematocrit and 

platelet count.  


Ferritin 420, , CRP 2.5 , D-dimer 1.26.


Stable electrolytes and renal parameters.  


Total bilirubin 1.4, direct bilirubin 0.5.  


, .  


In  emergency department patient received Lovenox,  steroid ,empiric antibiotic 

and admitted for further management.


Patient also received Pepcid and Zofran and admitted for further management. 





CONSULTANTS: 


pulmonary Dr. Fuller


ID specialist Dr. Wilfrido Valdez


GI specialist Dr. Gomez


 


 


[facility] COURSE: 


Patient admitted to medical surgical floor isolation room.  


Supplemental oxygen provided and titrated to keep pulse oximetry above 92%.  


Pulse oximetry  remained stable on room air.   


Remdesivir was not indicated at this time. 


Patient was continued with  steroids and antibiotics.    


Blood sugar was closely monitored,  remained stable.  


LFT were closely monitored.


Abdominal ultrasound revealed hepatic steatosis.  


Hepatitis panel was negative.


Per GI specialist  transaminitis was most likely due to COVID, probably partly 

to hepatic steatosis as well.


LFT were  trending down : AST from initial 272 down to 94, ALT from initial 2 4 

down to 127, total bilirubin down to normal 1.0 .


Supportive care provided.  


Patient clinically stabilized and was ready for discharge. patietn required 

isolation upon discharge,  not available at home. 


Patient was accepted to skilled nursing facility for a short term to complete 

isolation.  





FINAL DIAGNOSES: 


COVID-19 pneumonia


Transaminitis , possibly due to Covid


Hepatic steatosis


Lymphopenia


Diabetes mellitus





DISCHARGE MEDICATIONS:


See Medication Reconciliation list.





DISCHARGE INSTRUCTIONS:


Patient was discharged to the skilled nursing facility. 


Follow up with medical doctor at the facility.





I have been assigned to dictate discharge summary for this account. 


I was not involved in the patient's management.











Stephanie Dinero NP                Oct 22, 2020 09:31

## 2020-10-30 ENCOUNTER — HOSPITAL ENCOUNTER (INPATIENT)
Dept: HOSPITAL 72 - EMR | Age: 35
LOS: 5 days | Discharge: HOME | DRG: 280 | End: 2020-11-04
Payer: MEDICAID

## 2020-10-30 VITALS — SYSTOLIC BLOOD PRESSURE: 140 MMHG | DIASTOLIC BLOOD PRESSURE: 95 MMHG

## 2020-10-30 VITALS — DIASTOLIC BLOOD PRESSURE: 90 MMHG | SYSTOLIC BLOOD PRESSURE: 134 MMHG

## 2020-10-30 VITALS — HEIGHT: 65 IN | WEIGHT: 196.21 LBS | BODY MASS INDEX: 32.69 KG/M2

## 2020-10-30 DIAGNOSIS — D62: ICD-10-CM

## 2020-10-30 DIAGNOSIS — D69.6: ICD-10-CM

## 2020-10-30 DIAGNOSIS — J12.89: ICD-10-CM

## 2020-10-30 DIAGNOSIS — K21.9: ICD-10-CM

## 2020-10-30 DIAGNOSIS — K76.6: ICD-10-CM

## 2020-10-30 DIAGNOSIS — Z79.4: ICD-10-CM

## 2020-10-30 DIAGNOSIS — U07.1: ICD-10-CM

## 2020-10-30 DIAGNOSIS — D63.8: ICD-10-CM

## 2020-10-30 DIAGNOSIS — E11.65: ICD-10-CM

## 2020-10-30 DIAGNOSIS — K92.2: ICD-10-CM

## 2020-10-30 DIAGNOSIS — E46: ICD-10-CM

## 2020-10-30 DIAGNOSIS — K70.31: Primary | ICD-10-CM

## 2020-10-30 LAB
ADD MANUAL DIFF: YES
ALBUMIN SERPL-MCNC: 2.5 G/DL (ref 3.4–5)
ALBUMIN/GLOB SERPL: 0.8 {RATIO} (ref 1–2.7)
ALP SERPL-CCNC: 296 U/L (ref 46–116)
ALT SERPL-CCNC: 125 U/L (ref 12–78)
ANION GAP SERPL CALC-SCNC: 4 MMOL/L (ref 5–15)
APPEARANCE UR: CLEAR
APTT BLD: 26 SEC (ref 23–33)
APTT PPP: (no result) S
AST SERPL-CCNC: 77 U/L (ref 15–37)
BILIRUB SERPL-MCNC: 0.7 MG/DL (ref 0.2–1)
BUN SERPL-MCNC: 11 MG/DL (ref 7–18)
CALCIUM SERPL-MCNC: 7.9 MG/DL (ref 8.5–10.1)
CHLORIDE SERPL-SCNC: 101 MMOL/L (ref 98–107)
CO2 SERPL-SCNC: 30 MMOL/L (ref 21–32)
CREAT SERPL-MCNC: 1 MG/DL (ref 0.55–1.3)
ERYTHROCYTE [DISTWIDTH] IN BLOOD BY AUTOMATED COUNT: 13.1 % (ref 11.6–14.8)
GLOBULIN SER-MCNC: 3.3 G/DL
GLUCOSE UR STRIP-MCNC: (no result) MG/DL
HCT VFR BLD CALC: 45.9 % (ref 42–52)
HGB BLD-MCNC: 15 G/DL (ref 14.2–18)
INR PPP: 1.2 (ref 0.9–1.1)
KETONES UR QL STRIP: NEGATIVE
LEUKOCYTE ESTERASE UR QL STRIP: NEGATIVE
MCV RBC AUTO: 107 FL (ref 80–99)
NITRITE UR QL STRIP: NEGATIVE
PH UR STRIP: 6.5 [PH] (ref 4.5–8)
PLATELET # BLD: 83 K/UL (ref 150–450)
POTASSIUM SERPL-SCNC: 3.9 MMOL/L (ref 3.5–5.1)
PROT UR QL STRIP: NEGATIVE
RBC # BLD AUTO: 4.28 M/UL (ref 4.7–6.1)
SODIUM SERPL-SCNC: 134 MMOL/L (ref 136–145)
SP GR UR STRIP: 1 (ref 1–1.03)
UROBILINOGEN UR-MCNC: NORMAL MG/DL (ref 0–1)
WBC # BLD AUTO: 12.9 K/UL (ref 4.8–10.8)

## 2020-10-30 PROCEDURE — 86850 RBC ANTIBODY SCREEN: CPT

## 2020-10-30 PROCEDURE — 86705 HEP B CORE ANTIBODY IGM: CPT

## 2020-10-30 PROCEDURE — 82140 ASSAY OF AMMONIA: CPT

## 2020-10-30 PROCEDURE — 85025 COMPLETE CBC W/AUTO DIFF WBC: CPT

## 2020-10-30 PROCEDURE — 86803 HEPATITIS C AB TEST: CPT

## 2020-10-30 PROCEDURE — 85610 PROTHROMBIN TIME: CPT

## 2020-10-30 PROCEDURE — 83690 ASSAY OF LIPASE: CPT

## 2020-10-30 PROCEDURE — 86901 BLOOD TYPING SEROLOGIC RH(D): CPT

## 2020-10-30 PROCEDURE — 99285 EMERGENCY DEPT VISIT HI MDM: CPT

## 2020-10-30 PROCEDURE — 89051 BODY FLUID CELL COUNT: CPT

## 2020-10-30 PROCEDURE — 86900 BLOOD TYPING SEROLOGIC ABO: CPT

## 2020-10-30 PROCEDURE — 85730 THROMBOPLASTIN TIME PARTIAL: CPT

## 2020-10-30 PROCEDURE — 87340 HEPATITIS B SURFACE AG IA: CPT

## 2020-10-30 PROCEDURE — 71045 X-RAY EXAM CHEST 1 VIEW: CPT

## 2020-10-30 PROCEDURE — 96374 THER/PROPH/DIAG INJ IV PUSH: CPT

## 2020-10-30 PROCEDURE — 36415 COLL VENOUS BLD VENIPUNCTURE: CPT

## 2020-10-30 PROCEDURE — 80202 ASSAY OF VANCOMYCIN: CPT

## 2020-10-30 PROCEDURE — 82962 GLUCOSE BLOOD TEST: CPT

## 2020-10-30 PROCEDURE — 76942 ECHO GUIDE FOR BIOPSY: CPT

## 2020-10-30 PROCEDURE — 81003 URINALYSIS AUTO W/O SCOPE: CPT

## 2020-10-30 PROCEDURE — 86703 HIV-1/HIV-2 1 RESULT ANTBDY: CPT

## 2020-10-30 PROCEDURE — 96361 HYDRATE IV INFUSION ADD-ON: CPT

## 2020-10-30 PROCEDURE — 86709 HEPATITIS A IGM ANTIBODY: CPT

## 2020-10-30 PROCEDURE — 80053 COMPREHEN METABOLIC PANEL: CPT

## 2020-10-30 PROCEDURE — 74177 CT ABD & PELVIS W/CONTRAST: CPT

## 2020-10-30 PROCEDURE — 87081 CULTURE SCREEN ONLY: CPT

## 2020-10-30 PROCEDURE — 83036 HEMOGLOBIN GLYCOSYLATED A1C: CPT

## 2020-10-30 PROCEDURE — 85007 BL SMEAR W/DIFF WBC COUNT: CPT

## 2020-10-30 NOTE — NUR
ED Nurse Note: Spoke with pt's brother, consent given by patient. Family would 
like MD to call when admitted for plan of care. Babak Duarte (brother) [phone].

## 2020-10-30 NOTE — NUR
NURSE NOTES:

Received patient from Olive CHRISTOPHER at ER. The patient is alert and oriented x4 and does not seem 
to be in any acute distress at this time. He is on room air with Resp even and unlabored.The 
patient was able to ambulate to the restroom with a steady gait. On skin assessment, the 
skin is warm and soft with active bowel sounds noted in all four quadrant. He has a Right AC 
20g that is patent and asymptomatic. The bed in low and locked level with side rails up x2 
and call light within easy reach. Will followup with Dr. Gutierrez for admission order as 
indicated.

## 2020-10-30 NOTE — NUR
-------------------------------------------------------------------------------

            *** Note undone in EDM - 10/30/20 at 2313 by KEVIN ***            

-------------------------------------------------------------------------------

ED Nurse Note: pt transfered to [facility] 2109-0

## 2020-10-30 NOTE — DIAGNOSTIC IMAGING REPORT
EXAM:

  CT Abdomen and Pelvis With Intravenous Contrast

 

CLINICAL HISTORY:

  ABD DIST

 

TECHNIQUE:

  Axial computed tomography images of the abdomen and pelvis with 

intravenous contrast.  CTDI is 8 mGy and DLP is 503 mGy-cm.  One or more 

of the following dose reduction techniques were used: automated exposure 

control, adjustment of the mA and/or kV according to patient size, use of 

iterative reconstruction technique.

 

COMPARISON:

  Correlation with ultrasound of the abdomen dated 10/20/20.

 

FINDINGS:

  Lung bases:  There is bibasilar subsegmental atelectasis in the lungs.

 

 ABDOMEN:

  Liver:  There is cirrhosis of the liver with nodularity of the liver 

surface.  No suspicious liver lesion is seen.

  Gallbladder and bile ducts:  The gallbladder is partially contracted.  

No calcified stones.  No ductal dilation.

  Pancreas:  Unremarkable.  No mass.  No ductal dilation.

  Spleen:  Unremarkable.  No splenomegaly.

  Adrenals:  Unremarkable.  No mass.

  Kidneys and ureters:  Unremarkable.  No solid mass.  No hydronephrosis.

  Stomach and bowel:  Unremarkable.  No obstruction.  No mucosal 

thickening.

 

 PELVIS:

  Appendix:  No findings to suggest acute appendicitis.

  Bladder:  Unremarkable.  No mass.

  Reproductive:  Unremarkable as visualized.

 

 ABDOMEN and PELVIS:

  Intraperitoneal space:  There is moderate abdominal and pelvic ascites. 

 No free air.

  Bones/joints:  No acute fracture.  No dislocation.

  Soft tissues:  Unremarkable.

  Vasculature:  There are multiple varices at the gastrohepatic ligament 

region.  No abdominal aortic aneurysm.

  Lymph nodes:  Unremarkable.  No enlarged lymph nodes.

 

IMPRESSION:     

1.  There is moderate abdominal and pelvic ascites.

2.  Cirrhosis of the liver.

3.  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 

underlying portal venous hypertension.

4.  No other acute findings.

## 2020-10-30 NOTE — NUR
NURSE NOTES:

Received admission orders from Dr. Gutierrez, All orders executed as indicated. The patient 
remained calm and relaxed.

## 2020-10-30 NOTE — DIAGNOSTIC IMAGING REPORT
EXAM:

  XR Chest, 1 View

 

CLINICAL HISTORY:

  ABD PAIN

 

TECHNIQUE:

  Frontal view of the chest.

 

COMPARISON:

  Chest radiograph dated 10/18/20.

 

FINDINGS:

  Lungs:  There is interval improvement with decrease in the extent of 

bilateral reticular airspace opacities.

  Pleural space:  Unremarkable.  No pneumothorax.

  Heart:  Unremarkable.  No cardiomegaly.

  Mediastinum:  Unremarkable.

  Bones/joints:  Unremarkable.

 

IMPRESSION:     

  Interval improvement with decrease in bilateral pneumonia.

## 2020-10-30 NOTE — EMERGENCY ROOM REPORT
History of Present Illness


General


Chief Complaint:  Abdominal Pain


Source:  Patient


 (Joao Brady MD)





Present Illness


HPI


35-year-old male presents to ED for evaluation.  Brought in for blood in stool. 

Pain is dull, 7 out of 10, nonradiating.  States his abdomen is distended.  

Coming from skilled nursing facility.  Patient was recently admitted here for 

Covid pneumonia and subsequently discharged to facility.  Patient denies any 

fevers or chills.  Denies cough.  No other aggravating relieving factors.  

Denies any other associated symptoms


 (Jaoo Brady MD)


Allergies:  


Coded Allergies:  


     No Known Allergies (Unverified , 10/18/20)





COVID-19 Screening


Contact w/high risk pt:  Yes


Experienced COVID-19 symptoms?:  Yes


COVID-19 Testing performed PTA:  Yes


COVID-19 Screening:  Positive COVID-19


COVID-19 Testing Source:  np


 (Joao Brady MD)





Patient History


Past Medical History:  DM


Past Surgical History:  none


Pertinent Family History:  none


Social History:  Denies: smoking, alcohol use, drug use


Immunizations:  UTD


Reviewed Nursing Documentation:  PMH: Agreed; PSxH: Agreed (Joao Brady MD)





Nursing Documentation-PMH


Hx Cardiac Problems:  No


Hx Diabetes:  Yes


Hx Cancer:  No


Hx Gastrointestinal Problems:  No - GERD


Hx Neurological Problems:  No


 (Joao Brady MD)





Review of Systems


All Other Systems:  negative except mentioned in HPI


 (Joao Brady MD)





Physical Exam





Vital Signs








  Date Time  Temp Pulse Resp B/P (MAP) Pulse Ox O2 Delivery O2 Flow Rate FiO2


 


10/30/20 20:39  92 20 138/100 (113) 95 Room Air  


 


10/30/20 20:45 98.1       








Sp02 EP Interpretation:  reviewed, normal


General Appearance:  no apparent distress, alert, GCS 15, non-toxic


Head:  normocephalic, atraumatic


Eyes:  bilateral eye normal inspection, bilateral eye PERRL


ENT:  hearing grossly normal, normal pharynx, no angioedema, normal voice


Neck:  full range of motion, supple/symm/no masses


Respiratory:  chest non-tender, lungs clear, normal breath sounds, speaking full

sentences


Cardiovascular #1:  regular rate, rhythm, no edema


Cardiovascular #2:  2+ carotid (R), 2+ carotid (L), 2+ radial (R), 2+ radial 

(L), 2+ dorsalis pedis (R), 2+ dorsalis pedis (L)


Gastrointestinal:  normal bowel sounds, no guarding, no rebound, distended


Rectal:  deferred


Genitourinary:  normal inspection, no CVA tenderness


Musculoskeletal:  back normal, normal range of motion, gait/station normal, non-

tender


Neurologic:  alert, motor strength/tone normal, oriented x3, sensory intact, 

responsive, speech normal


Psychiatric:  judgement/insight normal, memory normal, mood/affect normal, no 

suicidal/homicidal ideation


Reflexes:  3+ bicep (R), 3+ bicep (L), 3+ tricep (R), 3+ tricep (L), 3+ knee 

(R), 3+ knee (L)


Skin:  no rash


Lymphatic:  no adenopathy


 (Joao Brady MD)





Medical Decision Making


Diagnostic Impression:  


   Primary Impression:  


   LGI bleed


   Additional Impressions:  


   COVID-19


   Hyperglycemia


   Ascites due to alcoholic cirrhosis


ER Course


This patient was signed out to me.  He was admitted before for Covid pneumonia. 

He is currently in a nursing home.  He presents with abdominal distention.  LFTs

are elevated.  CT scan was pending.  CT scan showed ascites and cirrhosis.  No 

other acute finding.  No obstruction.  Patient has been admitted to the 

hospital.


 (Solis Thompson MD)





Chest X-Ray Diagnostic Results


Chest X-Ray Diagnostic Results :  


   Chest X-Ray Ordered:  Yes


   # of Views/Limited/Complete:  1 View


   Indication:  Other


   Interpretation:  no effusion, no pneumothorax, other - resolving bilateral 

pneumonia


   Impression:  Other - Resolving bilateral pneumonia


   Electronically Signed by:  Electronically signed by Jooa Brady MD


 (Joao Brady MD)





CT/MRI/US Diagnostic Results


CT/MRI/US Diagnostic Results :  


   Imaging Test Ordered:  CT abdomen and pelvis


   Impression


Read by radiologist.  Moderate abdominal and pelvic ascites.  Cirrhosis of the 

liver.  Varices at the gastrohepatic ligament.


 (Solis Thompson MD)





Last Vital Signs








  Date Time  Temp Pulse Resp B/P (MAP) Pulse Ox O2 Delivery O2 Flow Rate FiO2


 


10/30/20 20:45  92 20   Room Air  


 


10/30/20 20:45 98.1   140/95 95   








Status:  improved


 (Joao Brady MD)


Status:  improved


 (Solis Thompson MD)


Disposition:  ADMITTED AS INPATIENT


Condition:  Serious


Referrals:  


Chele Gutierrez MD (PCP)











Joao Brady MD            Oct 30, 2020 21:55


Solis Thompson MD                  Oct 30, 2020 23:28

## 2020-10-30 NOTE — NUR
ED Nurse Note: Pt BIBA [facility] from [facility] 
d/t abdominal pain and abdominal bloating x2 days. Pt denies n/v/d, denies 
fever, and chills. Pt reports sweating from the pain. Pt states recent COVID 
diagnosis requiring hospitalization and has been discharged to [facility] 
for further recovery. Pt is AAOx4, breathing even and unlabored. No acute 
distress noted, vitals signs stable as documented.

## 2020-10-30 NOTE — NUR
ED Nurse Note: pt transfered to [facility] [id number] accompanied by EMT staff, pt in 
stable condition. Admission packet and all belongings taken with patient.

## 2020-10-31 VITALS — DIASTOLIC BLOOD PRESSURE: 71 MMHG | SYSTOLIC BLOOD PRESSURE: 136 MMHG

## 2020-10-31 VITALS — DIASTOLIC BLOOD PRESSURE: 86 MMHG | SYSTOLIC BLOOD PRESSURE: 142 MMHG

## 2020-10-31 VITALS — DIASTOLIC BLOOD PRESSURE: 68 MMHG | SYSTOLIC BLOOD PRESSURE: 133 MMHG

## 2020-10-31 VITALS — SYSTOLIC BLOOD PRESSURE: 146 MMHG | DIASTOLIC BLOOD PRESSURE: 101 MMHG

## 2020-10-31 VITALS — SYSTOLIC BLOOD PRESSURE: 147 MMHG | DIASTOLIC BLOOD PRESSURE: 98 MMHG

## 2020-10-31 VITALS — DIASTOLIC BLOOD PRESSURE: 76 MMHG | SYSTOLIC BLOOD PRESSURE: 148 MMHG

## 2020-10-31 LAB
ADD MANUAL DIFF: YES
ALBUMIN SERPL-MCNC: 2.1 G/DL (ref 3.4–5)
ALBUMIN/GLOB SERPL: 0.7 {RATIO} (ref 1–2.7)
ALP SERPL-CCNC: 236 U/L (ref 46–116)
ALT SERPL-CCNC: 102 U/L (ref 12–78)
ANION GAP SERPL CALC-SCNC: 5 MMOL/L (ref 5–15)
AST SERPL-CCNC: 66 U/L (ref 15–37)
BILIRUB SERPL-MCNC: 0.6 MG/DL (ref 0.2–1)
BUN SERPL-MCNC: 9 MG/DL (ref 7–18)
CALCIUM SERPL-MCNC: 7.8 MG/DL (ref 8.5–10.1)
CHLORIDE SERPL-SCNC: 106 MMOL/L (ref 98–107)
CO2 SERPL-SCNC: 28 MMOL/L (ref 21–32)
CREAT SERPL-MCNC: 0.8 MG/DL (ref 0.55–1.3)
ERYTHROCYTE [DISTWIDTH] IN BLOOD BY AUTOMATED COUNT: 12.9 % (ref 11.6–14.8)
GLOBULIN SER-MCNC: 3 G/DL
HCT VFR BLD CALC: 39.9 % (ref 42–52)
HGB BLD-MCNC: 13.3 G/DL (ref 14.2–18)
MCV RBC AUTO: 104 FL (ref 80–99)
PLATELET # BLD: 83 K/UL (ref 150–450)
POTASSIUM SERPL-SCNC: 3.7 MMOL/L (ref 3.5–5.1)
RBC # BLD AUTO: 3.84 M/UL (ref 4.7–6.1)
SODIUM SERPL-SCNC: 139 MMOL/L (ref 136–145)
WBC # BLD AUTO: 13 K/UL (ref 4.8–10.8)

## 2020-10-31 RX ADMIN — INSULIN ASPART SCH UNITS: 100 INJECTION, SOLUTION INTRAVENOUS; SUBCUTANEOUS at 06:48

## 2020-10-31 RX ADMIN — MORPHINE SULFATE PRN MG: 2 INJECTION, SOLUTION INTRAMUSCULAR; INTRAVENOUS at 21:20

## 2020-10-31 RX ADMIN — SODIUM CHLORIDE SCH MLS/HR: 0.9 INJECTION INTRAVENOUS at 12:23

## 2020-10-31 RX ADMIN — SPIRONOLACTONE SCH MG: 50 TABLET, FILM COATED ORAL at 10:45

## 2020-10-31 RX ADMIN — INSULIN ASPART SCH UNITS: 100 INJECTION, SOLUTION INTRAVENOUS; SUBCUTANEOUS at 16:52

## 2020-10-31 RX ADMIN — SODIUM CHLORIDE SCH MLS/HR: 9 INJECTION, SOLUTION INTRAVENOUS at 21:07

## 2020-10-31 RX ADMIN — INSULIN DETEMIR SCH UNITS: 100 INJECTION, SOLUTION SUBCUTANEOUS at 15:07

## 2020-10-31 RX ADMIN — INSULIN ASPART SCH UNITS: 100 INJECTION, SOLUTION INTRAVENOUS; SUBCUTANEOUS at 21:09

## 2020-10-31 RX ADMIN — INSULIN ASPART SCH UNITS: 100 INJECTION, SOLUTION INTRAVENOUS; SUBCUTANEOUS at 16:53

## 2020-10-31 RX ADMIN — INSULIN ASPART SCH UNITS: 100 INJECTION, SOLUTION INTRAVENOUS; SUBCUTANEOUS at 11:51

## 2020-10-31 NOTE — HISTORY AND PHYSICAL REPORT
DATE OF ADMISSION:  10/30/2020

This is Dr. Helton for Dr. Gutierrez



TIME SEEN:  10 a.m.



CONSULTANTS:

1. Richardson Huber MD.

2. Doreen Gomez MD.



CHIEF COMPLAINT:  Abdominal pain, diabetes, hyperglycemia, COVID.



BRIEF HISTORY:  The patient is a 35-year-old male presented to [facility] with above-mentioned diagnoses, admitted to medical floor.

Currently, sleeping in bed, not talking much, unable to give further

history.



PAST MEDICAL HISTORY:  From chart includes lower GI bleed, ascites due to

alcoholic cirrhosis, pneumonia due to COVID.



PAST SURGICAL HISTORY:  Unknown.



MEDICATIONS:  _____ furosemide, insulin, morphine.



ALLERGIES:  Denies.



SOCIAL HISTORY:  Unable to obtain due to the patient's condition.



PHYSICAL EXAMINATION:

GENERAL:  Calm, sleeping in bed.

VITAL SIGNS:  Temperature is 98 degrees, pulse 78, respiratory rate  18,

blood pressure 147/98.

HEENT:  Normocephalic and atraumatic.

NECK:  Trachea is midline.

CARDIOVASCULAR:  No peripheral edema.

LUNGS:  Breathing comfortably on room air.

ABDOMEN:  No apparent wounds.

EXTREMITIES:  No cyanosis or clubbing.



LABORATORY AND DIAGNOSTIC DATA:  White count 13, hemoglobin and hematocrit

13/39, platelets is 83.  BMP shows a glucose 264.  Albumin 2.1.  INR is

1.2.  Urinalysis show 4+ glucose.



ASSESSMENT:

1. Abdominal pain.

2. Alcoholic cirrhosis.

3. Ascites.

4. Diabetes, hyperglycemia.

5. Elevated LFT.

6. Thrombocytopenia.

7. Lower GI bleed.

8. Malnutrition.

9. Possible COVID infection.



PLAN:

1. O2, pulmonary treatment.

2. Antibiotics per Infectious Disease.

3. Blood pressure, blood sugar, pain control.

4. GI followup.

5. Nephrology followup.

6. Hematology/ ID followup as well.









  ______________________________________________

  Hieu Helton D.O.





DR:  Zack

D:  10/31/2020 10:12

T:  10/31/2020 10:20

JOB#:  [id number]

CC:

## 2020-10-31 NOTE — NUR
NURSE NOTES:

PT AXOX4, CALM, RESTING IN BED. PT DENIES HAVING FEVER, CHILLS, DIFFICULTY BREATHING. PT 
STATES HE HAS PAIN MINIMAL PAIN OF LOWER ABDOMEN. ABDOMEN IS SOFT AND ROUND, NON-TENDER TO 
TOUCH. PT STATES HE IS HAVING REGULAR BOWEL MOVEMENTS, NO DIFFICULTY URINATING. BED IN 
LOWEST POSITION WITH BEDSIDE RAILS X2 RAISED. CALL LIGHT WITHIN REACH. WILL CONTINUE TO 
MONITOR.

## 2020-10-31 NOTE — CONSULTATION
DATE OF CONSULTATION:  10/31/2020

ENDOCRINOLOGY CONSULTATION



REFERRING PHYSICIAN:  Hieu Helton D.O.



REASON FOR CONSULTATION:  Diabetes management.



HISTORY OF PRESENT ILLNESS:  The patient is a 35-year-old male with history

of diabetes and COVID infection, who presents to the hospital with blood

in the stool, admitted to observation bed for GI evaluation.  I was called

to manage diabetes.



PAST MEDICAL HISTORY:

1. COVID infection.

2. Diabetes.



PAST SURGICAL HISTORY:  None.



FAMILY HISTORY:  Noncontributory.



SOCIAL HISTORY:  No smoking, alcohol, or drug use.



REVIEW OF SYSTEMS:  As per HPI.



PHYSICAL EXAMINATION:

VITAL SIGNS:  Blood pressure 142/86, heart rate 97, respiratory rate 17,

temperature 98.2.



The rest of the exam is deferred due to COVID-19 infection.



DIAGNOSES:

1. GI bleed.

2. COVID infection.

3. Diabetes, out of control.



PLAN:

1. Start Levemir 18 units daily.

2. Start NovoLog 6 units before each meal.

3. NovoLog sliding scale before meals and at bedtime.

4. Further adjustment according to blood glucose values.



Thank you Dr. Helton for the courtesy of this consultation.









  ______________________________________________

  Richardson Huber M.D.





DR:  RN/V

D:  10/31/2020 14:10

T:  10/31/2020 15:52

JOB#:  [id number]

CC:

## 2020-10-31 NOTE — CONSULTATION
History of Present Illness


General


Date patient seen:  Oct 31, 2020


Chief Complaint:  Abdominal Pain


Referring physician:  Danie


Reason for Consultation:  COVID 19





Present Illness


HPI


Mr. Hilliard is a 34 yo male with with PMHx of DM and COVID 19 Dx 10/19/20 who 

presented to the ED on 10/30/20 with blood in his stool and abdominal pain. He 

denies SOB, N,V,D, SOB and cough. His CXR shows improved B/L infiltrates. CT 

scan x showed Livers cirrhosis and ascites. The patients abdominal pain has 

improved and he is no on abx. Cx pending.  He has a mild leukocytosis and no 

fever. He is on RA.





ID was consulted for abd pain





PMHx


COVID 19


DM





SocHx


Denies T/D


Social EtOH





FamHx


Not contributory


Allergies:  


Coded Allergies:  


     No Known Allergies (Unverified , 10/18/20)





Medication History


Scheduled


Ascorbic Acid* (Vitamin C*), 500 MG ORAL DAILY, (Reported)


Magnesium Hydroxide (Milk of Magnesia), 30 ML ORAL DAILY, (Reported)


Multivitamin With Minerals (Multivitamins With Minerals*), 1 TAB ORAL DAILY, 

(Reported)


Na Phos,M-B/Na Phos,Di-Ba* (Fleet Enema*), 133 ML RECTAL DAILY, (Reported)


Zinc Sulfate (Zinc Sulfate), 220 MG ORAL DAILY, (Reported)





Scheduled PRN


Acetaminophen* (Acetaminophen 325MG Tablet*), 325 MG ORAL Q4H PRN for  , 

(Reported)


Ondansetron (Zofran), 4 MG ORAL Q6H PRN for Nausea & Vomiting, (Reported)





Miscellaneous Medications


Bisacodyl (Dulcolax), 10 MG RC, (Reported)


Cranberry Fruit (Cranberry), 400 MG PO, (Reported)


Dexamethasone (Dexamethasone), 10 GM MC, (Reported)





Patient History


Healthcare decision maker





Resuscitation status





Advanced Directive on File








Review of Systems


ROS Narrative


ROS Negative except as noted  in the HPI





Physical Exam





Last 24 Hour Vital Signs








  Date Time  Temp Pulse Resp B/P (MAP) Pulse Ox O2 Delivery O2 Flow Rate FiO2


 


10/31/20 09:00      Room Air  


 


10/31/20 08:00 98.2 78 18 147/98 (114) 97   


 


10/31/20 04:00 98.4 78 17 133/68 (89) 99   


 


10/31/20 00:00 98.7 75 17 136/71 (92) 97   


 


10/30/20 23:37      Room Air  


 


10/30/20 23:13 97.7 82 16 133/87 100 Room Air  


 


10/30/20 22:56 97.8 76 16 134/90 98 Room Air  


 


10/30/20 20:45  92 20   Room Air  


 


10/30/20 20:45 98.1 87 20 140/95 95 Room Air  


 


10/30/20 20:39  92 20 138/100 (113) 95 Room Air  

















Intake and Output0  


 


 10/30/20 10/31/20





 19:00 07:00


 


Intake Total  3689 ml


 


Balance  3689 ml


 


  


 


Intake Oral  380 ml


 


IV Total  3309 ml


 


# Voids  1











Laboratory Tests








Test


 10/30/20


20:15 10/30/20


20:55 10/30/20


22:52 10/31/20


05:26


 


White Blood Count


 12.9 K/UL


(4.8-10.8)  H 


 


 





 


Red Blood Count


 4.28 M/UL


(4.70-6.10)  L 


 


 





 


Hemoglobin


 15.0 G/DL


(14.2-18.0) 


 


 





 


Hematocrit


 45.9 %


(42.0-52.0) 


 


 





 


Mean Corpuscular Volume


 107 FL (80-99)


H 


 


 





 


Mean Corpuscular Hemoglobin


 35.0 PG


(27.0-31.0)  H 


 


 





 


Mean Corpuscular Hemoglobin


Concent 32.7 G/DL


(32.0-36.0) 


 


 





 


Red Cell Distribution Width


 13.1 %


(11.6-14.8) 


 


 





 


Platelet Count


 83 K/UL


(150-450)  L 


 


 





 


Mean Platelet Volume


 14.2 FL


(6.5-10.1)  H 


 


 





 


Neutrophils (%) (Auto)


 % (45.0-75.0)


 


 


 





 


Lymphocytes (%) (Auto)


 % (20.0-45.0)


 


 


 





 


Monocytes (%) (Auto)  % (1.0-10.0)     


 


Eosinophils (%) (Auto)  % (0.0-3.0)     


 


Basophils (%) (Auto)  % (0.0-2.0)     


 


Differential Total Cells


Counted 100  


 


 


 





 


Neutrophils % (Manual) 73 % (45-75)     


 


Lymphocytes % (Manual) 18 % (20-45)  L   


 


Monocytes % (Manual) 6 % (1-10)     


 


Eosinophils % (Manual) 3 % (0-3)     


 


Basophils % (Manual) 0 % (0-2)     


 


Band Neutrophils 0 % (0-8)     


 


Platelet Estimate Decreased  L   


 


Platelet Morphology Normal     


 


Macrocytosis 1+     


 


Prothrombin Time


 12.7 SEC


(9.30-11.50)  H 


 


 





 


Prothromb Time International


Ratio 1.2 (0.9-1.1)


H 


 


 





 


Activated Partial


Thromboplast Time 26 SEC (23-33)


 


 


 





 


Sodium Level


 134 MMOL/L


(136-145)  L 


 


 





 


Potassium Level


 3.9 MMOL/L


(3.5-5.1) 


 


 





 


Chloride Level


 101 MMOL/L


() 


 


 





 


Carbon Dioxide Level


 30 MMOL/L


(21-32) 


 


 





 


Anion Gap


 4 mmol/L


(5-15)  L 


 


 





 


Blood Urea Nitrogen


 11 mg/dL


(7-18) 


 


 





 


Creatinine


 1.0 MG/DL


(0.55-1.30) 


 


 





 


Estimat Glomerular Filtration


Rate > 60 mL/min


(>60) 


 


 





 


Glucose Level


 504 MG/DL


()  *H 


 


 





 


Calcium Level


 7.9 MG/DL


(8.5-10.1)  L 


 


 





 


Total Bilirubin


 0.7 MG/DL


(0.2-1.0) 


 


 





 


Aspartate Amino Transf


(AST/SGOT) 77 U/L (15-37)


H 


 


 





 


Alanine Aminotransferase


(ALT/SGPT) 125 U/L


(12-78)  H 


 


 





 


Alkaline Phosphatase


 296 U/L


()  H 


 


 





 


Total Protein


 5.8 G/DL


(6.4-8.2)  L 


 


 





 


Albumin


 2.5 G/DL


(3.4-5.0)  L 


 


 





 


Globulin 3.3 g/dL     


 


Albumin/Globulin Ratio


 0.8 (1.0-2.7)


L 


 


 





 


Lipase


 368 U/L


() 


 


 





 


Urine Color  Pale yellow    


 


Urine Appearance  Clear    


 


Urine pH  6.5 (4.5-8.0)    


 


Urine Specific Gravity


 


 1.005


(1.005-1.035) 


 





 


Urine Protein


 


 Negative


(NEGATIVE) 


 





 


Urine Glucose (UA)


 


 4+ (NEGATIVE)


H 


 





 


Urine Ketones


 


 Negative


(NEGATIVE) 


 





 


Urine Blood


 


 Negative


(NEGATIVE) 


 





 


Urine Nitrite


 


 Negative


(NEGATIVE) 


 





 


Urine Bilirubin


 


 Negative


(NEGATIVE) 


 





 


Urine Urobilinogen


 


 Normal MG/DL


(0.0-1.0) 


 





 


Urine Leukocyte Esterase


 


 Negative


(NEGATIVE) 


 





 


POC Whole Blood Glucose


 


 


 338 MG/DL


()  H 259 MG/DL


()  H


 


Test


 10/31/20


06:00 


 


 





 


White Blood Count


 13.0 K/UL


(4.8-10.8)  H 


 


 





 


Red Blood Count


 3.84 M/UL


(4.70-6.10)  L 


 


 





 


Hemoglobin


 13.3 G/DL


(14.2-18.0)  L 


 


 





 


Hematocrit


 39.9 %


(42.0-52.0)  L 


 


 





 


Mean Corpuscular Volume


 104 FL (80-99)


H 


 


 





 


Mean Corpuscular Hemoglobin


 34.6 PG


(27.0-31.0)  H 


 


 





 


Mean Corpuscular Hemoglobin


Concent 33.3 G/DL


(32.0-36.0) 


 


 





 


Red Cell Distribution Width


 12.9 %


(11.6-14.8) 


 


 





 


Platelet Count


 83 K/UL


(150-450)  L 


 


 





 


Mean Platelet Volume


 14.5 FL


(6.5-10.1)  H 


 


 





 


Neutrophils (%) (Auto)


 % (45.0-75.0)


 


 


 





 


Lymphocytes (%) (Auto)


 % (20.0-45.0)


 


 


 





 


Monocytes (%) (Auto)  % (1.0-10.0)     


 


Eosinophils (%) (Auto)  % (0.0-3.0)     


 


Basophils (%) (Auto)  % (0.0-2.0)     


 


Differential Total Cells


Counted 100  


 


 


 





 


Neutrophils % (Manual) 71 % (45-75)     


 


Lymphocytes % (Manual) 28 % (20-45)     


 


Monocytes % (Manual) 1 % (1-10)     


 


Eosinophils % (Manual) 0 % (0-3)     


 


Basophils % (Manual) 0 % (0-2)     


 


Band Neutrophils 0 % (0-8)     


 


Platelet Estimate Decreased  L   


 


Platelet Morphology Normal     


 


Macrocytosis 1+     


 


Sodium Level


 139 MMOL/L


(136-145) 


 


 





 


Potassium Level


 3.7 MMOL/L


(3.5-5.1) 


 


 





 


Chloride Level


 106 MMOL/L


() 


 


 





 


Carbon Dioxide Level


 28 MMOL/L


(21-32) 


 


 





 


Anion Gap


 5 mmol/L


(5-15) 


 


 





 


Blood Urea Nitrogen


 9 mg/dL (7-18)


 


 


 





 


Creatinine


 0.8 MG/DL


(0.55-1.30) 


 


 





 


Estimat Glomerular Filtration


Rate > 60 mL/min


(>60) 


 


 





 


Glucose Level


 264 MG/DL


()  #H 


 


 





 


Hemoglobin A1c


 8.9 %


(4.3-6.0)  H 


 


 





 


Calcium Level


 7.8 MG/DL


(8.5-10.1)  L 


 


 





 


Total Bilirubin


 0.6 MG/DL


(0.2-1.0) 


 


 





 


Aspartate Amino Transf


(AST/SGOT) 66 U/L (15-37)


H 


 


 





 


Alanine Aminotransferase


(ALT/SGPT) 102 U/L


(12-78)  H 


 


 





 


Alkaline Phosphatase


 236 U/L


()  H 


 


 





 


Total Protein


 5.1 G/DL


(6.4-8.2)  L 


 


 





 


Albumin


 2.1 G/DL


(3.4-5.0)  L 


 


 





 


Globulin 3.0 g/dL     


 


Albumin/Globulin Ratio


 0.7 (1.0-2.7)


L 


 


 














Microbiology








 Date/Time


Source Procedure


Growth Status





 


 10/30/20 22:13


Rectal Mucosa  Received


 


 10/30/20 20:51


Nasopharynx SARS-CoV-2 RdRp Gene Assay - Final Complete








Height (Feet):  5


Height (Inches):  5.00


Weight (Pounds):  250


Medications





Current Medications








 Medications


  (Trade)  Dose


 Ordered  Sig/Mana


 Route


 PRN Reason  Start Time


 Stop Time Status Last Admin


Dose Admin


 


 Dextrose


  (Dextrose 50%)  25 ml  Q30M  PRN


 IV


 Hypoglycemia  10/31/20 00:30


 1/29/21 00:29   





 


 Dextrose


  (Dextrose 50%)  50 ml  Q30M  PRN


 IV


 Hypoglycemia  10/31/20 00:30


 1/29/21 00:29   





 


 Furosemide


  (Lasix)  20 mg  DAILY


 IV


   10/31/20 10:00


 11/30/20 09:59  10/31/20 10:45





 


 Insulin Aspart


  (NovoLOG)    BEFORE MEALS AND  HS


 SUBQ


   10/31/20 06:30


 1/29/21 06:29  10/31/20 06:48





 


 Iohexol


  (OMNIPAQUE-300


 100ml)  100 ml  NOW  PRN


 INJ


 Radiology Procedure  10/30/20 21:00


 11/1/20 20:59   





 


 Morphine Sulfate


  (Morphine


 Sulfate)  2 mg  Q4H  PRN


 IVP


 For Pain  10/31/20 00:15


 11/7/20 00:14   





 


 Spironolactone


  (Aldactone)  50 mg  DAILY


 ORAL


   10/31/20 10:00


 11/30/20 09:59  10/31/20 10:45











Objective Narrative


GEN: NAD on RA


HEENT: NCAT, MMM, EOMI, No Scleral icterus, 


Neck: No LAD, Supple


LUNGS: CTAB, No W


HEART: RRR, S1, S2. 


ABD: Soft, NT, Mild Distension


Skin: No rash,, Normal in color


NEURO: A/O x 4, No focal deficits





Assessment/Plan


Assessment/Plan:


34 yo male with with PMHx of DM and COVID 19 Dx 10/19/20 who presented to the ED

on 10/30/20 with blood in his stool and abdominal pain.





Leukocytosis


   Unclear cause  GIB vs less likely Peritonitis? Other





No fever


Abdominal Pain


COVID 19


     Stable on RA


     SARS COV 2 PCR Pos 10/18/20 and 10/30/20


     CXR  10/30/20  Improved B/L Infiltrates





DM


Cirrhosis  seen on CT





PLAN





- Start Ceftriaxone and Vancomycin for pending Cx





- f/u Cx





- f/u Paracentesis - Monday?





- COVID 19 Iso for now





- Monitor Respiratory status





- f/u GI recs for GI Bleed





Thank you for this consult. [facility] will continue to follow Mr. Hilliard 

with you.











Pavan Varner MD            Oct 31, 2020 11:00

## 2020-10-31 NOTE — GENERAL PROGRESS NOTE
Subjective


ROS Limited/Unobtainable:  Yes


Allergies:  


Coded Allergies:  


     No Known Allergies (Unverified , 10/18/20)





Objective





Last 24 Hour Vital Signs








  Date Time  Temp Pulse Resp B/P (MAP) Pulse Ox O2 Delivery O2 Flow Rate FiO2


 


10/31/20 08:00 98.2 78 18 147/98 (114) 97   


 


10/31/20 04:00 98.4 78 17 133/68 (89) 99   


 


10/31/20 00:00 98.7 75 17 136/71 (92) 97   


 


10/30/20 23:37      Room Air  


 


10/30/20 23:13 97.7 82 16 133/87 100 Room Air  


 


10/30/20 22:56 97.8 76 16 134/90 98 Room Air  


 


10/30/20 20:45  92 20   Room Air  


 


10/30/20 20:45 98.1 87 20 140/95 95 Room Air  


 


10/30/20 20:39  92 20 138/100 (113) 95 Room Air  

















Intake and Output  


 


 10/30/20 10/31/20





 19:00 07:00


 


Intake Total  3689 ml


 


Balance  3689 ml


 


  


 


Intake Oral  380 ml


 


IV Total  3309 ml


 


# Voids  1








Laboratory Tests


10/30/20 20:15: 


White Blood Count 12.9H, Red Blood Count 4.28L, Hemoglobin 15.0, Hematocrit 

45.9, Mean Corpuscular Volume 107H, Mean Corpuscular Hemoglobin 35.0H, Mean 

Corpuscular Hemoglobin Concent 32.7, Red Cell Distribution Width 13.1, Platelet 

Count 83L, Mean Platelet Volume 14.2H, Neutrophils (%) (Auto) , Lymphocytes (%) 

(Auto) , Monocytes (%) (Auto) , Eosinophils (%) (Auto) , Basophils (%) (Auto) , 

Differential Total Cells Counted 100, Neutrophils % (Manual) 73, Lymphocytes % 

(Manual) 18L, Monocytes % (Manual) 6, Eosinophils % (Manual) 3, Basophils % 

(Manual) 0, Band Neutrophils 0, Platelet Estimate DecreasedL, Platelet 

Morphology Normal, Macrocytosis 1+, Prothrombin Time 12.7H, Prothromb Time 

International Ratio 1.2H, Activated Partial Thromboplast Time 26, Sodium Level 

134L, Potassium Level 3.9, Chloride Level 101, Carbon Dioxide Level 30, Anion 

Gap 4L, Blood Urea Nitrogen 11, Creatinine 1.0, Estimat Glomerular Filtration 

Rate > 60, Glucose Level 504*H, Calcium Level 7.9L, Total Bilirubin 0.7, 

Aspartate Amino Transf (AST/SGOT) 77H, Alanine Aminotransferase (ALT/SGPT) 125H,

Alkaline Phosphatase 296H, Total Protein 5.8L, Albumin 2.5L, Globulin 3.3, 

Albumin/Globulin Ratio 0.8L, Lipase 368


10/30/20 20:55: 


Urine Color Pale yellow, Urine Appearance Clear, Urine pH 6.5, Urine Specific 

Gravity 1.005, Urine Protein Negative, Urine Glucose (UA) 4+H, Urine Ketones 

Negative, Urine Blood Negative, Urine Nitrite Negative, Urine Bilirubin 

Negative, Urine Urobilinogen Normal, Urine Leukocyte Esterase Negative


10/30/20 22:52: POC Whole Blood Glucose 338H


10/31/20 05:26: POC Whole Blood Glucose 259H


10/31/20 06:00: 


White Blood Count 13.0H, Red Blood Count 3.84L, Hemoglobin 13.3L, Hematocrit 

39.9L, Mean Corpuscular Volume 104H, Mean Corpuscular Hemoglobin 34.6H, Mean 

Corpuscular Hemoglobin Concent 33.3, Red Cell Distribution Width 12.9, Platelet 

Count 83L, Mean Platelet Volume 14.5H, Neutrophils (%) (Auto) , Lymphocytes (%) 

(Auto) , Monocytes (%) (Auto) , Eosinophils (%) (Auto) , Basophils (%) (Auto) , 

Differential Total Cells Counted 100, Neutrophils % (Manual) 71, Lymphocytes % 

(Manual) 28, Monocytes % (Manual) 1, Eosinophils % (Manual) 0, Basophils % 

(Manual) 0, Band Neutrophils 0, Platelet Estimate DecreasedL, Platelet 

Morphology Normal, Macrocytosis 1+, Sodium Level 139, Potassium Level 3.7, 

Chloride Level 106, Carbon Dioxide Level 28, Anion Gap 5, Blood Urea Nitrogen 9,

Creatinine 0.8, Estimat Glomerular Filtration Rate > 60, Glucose Level 264#H, 

Hemoglobin A1c 8.9H, Calcium Level 7.8L, Total Bilirubin 0.6, Aspartate Amino 

Transf (AST/SGOT) 66H, Alanine Aminotransferase (ALT/SGPT) 102H, Alkaline 

Phosphatase 236H, Total Protein 5.1L, Albumin 2.1L, Globulin 3.0, 

Albumin/Globulin Ratio 0.7L


Height (Feet):  5


Height (Inches):  5.00


Weight (Pounds):  250


General Appearance:  alert


EENT:  normal ENT inspection


Neck:  supple


Cardiovascular:  normal rate


Respiratory/Chest:  decreased breath sounds


Abdomen:  hypoactive bowel sounds, tender


Extremities:  non-tender





Assessment/Plan


Assessment/Plan:


cirrhosis


ascites


portal HTN


DM


elevated LFTS


Covid +





hepatitis panel


lasix and aldactone


paracentesis to r/o SBP


repeat labs


ammonia level











Humberto Nielson MD             Oct 31, 2020 09:12

## 2020-10-31 NOTE — NUR
NURSE HAND-OFF: 



Important Events on Shift: PT'S BROTHER, STEVE, TO SPEAK WITH DR FLOWERS REGARDING 
PARACENTESIS. CONSENT FOR PARACENTESIS PENDING. 

Patient Status: STABLE

Diet: CCHO MEDIUM



Pending Orders: N/A

Pending Results/Labs:N/A

Pending MD notification:N/A



Latest Vital Signs: Temperature 98.1 , Pulse 89 , B/P 148 /76 , Respiratory Rate 18 , O2 SAT 
98 , Room Air, O2 Flow Rate .  

Vital Sign Comment: STABLE



Latest Wood Fall Score: 20  

Fall Risk: Low Risk 

Safety Measures: Call light Within Reach, Bed Alarm Zone 1, Side Rails Side Rails x2, Bed 
position Low and Locked.

Fall Precautions: 

Patient Fall Education


-------------------------------------------------------------------------------

Addendum: 10/31/20 at 1903 by JOVANNY HICKS RN RN

-------------------------------------------------------------------------------

HAND-OFF: 

Report given to JIM ROCA RN.

## 2020-10-31 NOTE — NUR
NURSE NOTES:

Received report from ASHWIN Bo. AAO x 4, on room air, ambulatory. No SOB or labored breathing. 
ABD distended noted. Consent sign form is pending. IV site intact and patent. Bed locked, 
lowest position, side rails up, alarm on, call light within reach. Will continue to monitor.

## 2020-10-31 NOTE — NUR
NURSE HAND-OFF: 



Important Events on Shift:elevated blood sugar.

Patient Status: 

Diet: 



Pending Orders: 

Pending Results/Labs:

Pending MD notification:



Latest Vital Signs: Temperature 98.4 , Pulse 78 , B/P 133 /68 , Respiratory Rate 17 , O2 SAT 
99 , Room Air, O2 Flow Rate .  

Vital Sign Comment: 



Latest Wood Fall Score: 35  

Fall Risk: Medium Risk 

Safety Measures: Call light Within Reach, Bed Alarm Zone 1, Side Rails Side Rails x1, Bed 
position Low and Locked.

Fall Precautions: 

Yellow Socks

Yellow Gown

Patient Fall Education



Report given to .

## 2020-10-31 NOTE — NUR
NURSE NOTES:

RN SPOKE TO PT'S BROTHER, STEVE AND EDUCATED ON PLAN FOR PARACENTESIS. RN PROVIDED PT AND 
STEVE FLOWERS'S NUMBER FOR RISK AND BENEFITS OF PROCEDURE. RN LEFT MESSAGE FOR DR FLOWERS, PROVIDING MD WITH STEVE'S PHONE NUMBER AND REQUEST TO GO OVER PROCEDURE. 

-------------------------------------------------------------------------------

Addendum: 10/31/20 at 1819 by JOVANNY HICKS RN RN

-------------------------------------------------------------------------------

STEVE'S PHONE NUMBER [phone]

## 2020-11-01 VITALS — DIASTOLIC BLOOD PRESSURE: 97 MMHG | SYSTOLIC BLOOD PRESSURE: 153 MMHG

## 2020-11-01 VITALS — SYSTOLIC BLOOD PRESSURE: 147 MMHG | DIASTOLIC BLOOD PRESSURE: 101 MMHG

## 2020-11-01 VITALS — DIASTOLIC BLOOD PRESSURE: 95 MMHG | SYSTOLIC BLOOD PRESSURE: 145 MMHG

## 2020-11-01 VITALS — DIASTOLIC BLOOD PRESSURE: 99 MMHG | SYSTOLIC BLOOD PRESSURE: 143 MMHG

## 2020-11-01 VITALS — DIASTOLIC BLOOD PRESSURE: 99 MMHG | SYSTOLIC BLOOD PRESSURE: 150 MMHG

## 2020-11-01 VITALS — DIASTOLIC BLOOD PRESSURE: 101 MMHG | SYSTOLIC BLOOD PRESSURE: 148 MMHG

## 2020-11-01 LAB
ADD MANUAL DIFF: YES
ALBUMIN SERPL-MCNC: 2.3 G/DL (ref 3.4–5)
ALBUMIN/GLOB SERPL: 0.7 {RATIO} (ref 1–2.7)
ALP SERPL-CCNC: 199 U/L (ref 46–116)
ALT SERPL-CCNC: 105 U/L (ref 12–78)
AMMONIA PLAS-SCNC: 60 UMOL/L (ref 11–32)
ANION GAP SERPL CALC-SCNC: 8 MMOL/L (ref 5–15)
AST SERPL-CCNC: 79 U/L (ref 15–37)
BILIRUB SERPL-MCNC: 0.9 MG/DL (ref 0.2–1)
BUN SERPL-MCNC: 7 MG/DL (ref 7–18)
CALCIUM SERPL-MCNC: 7.9 MG/DL (ref 8.5–10.1)
CHLORIDE SERPL-SCNC: 105 MMOL/L (ref 98–107)
CO2 SERPL-SCNC: 25 MMOL/L (ref 21–32)
CREAT SERPL-MCNC: 0.6 MG/DL (ref 0.55–1.3)
ERYTHROCYTE [DISTWIDTH] IN BLOOD BY AUTOMATED COUNT: 12.9 % (ref 11.6–14.8)
GLOBULIN SER-MCNC: 3.4 G/DL
HCT VFR BLD CALC: 38.8 % (ref 42–52)
HGB BLD-MCNC: 13.2 G/DL (ref 14.2–18)
MCV RBC AUTO: 103 FL (ref 80–99)
PLATELET # BLD: 91 K/UL (ref 150–450)
POTASSIUM SERPL-SCNC: 3.5 MMOL/L (ref 3.5–5.1)
RBC # BLD AUTO: 3.76 M/UL (ref 4.7–6.1)
SODIUM SERPL-SCNC: 138 MMOL/L (ref 136–145)
WBC # BLD AUTO: 11.4 K/UL (ref 4.8–10.8)

## 2020-11-01 RX ADMIN — INSULIN ASPART SCH UNITS: 100 INJECTION, SOLUTION INTRAVENOUS; SUBCUTANEOUS at 05:40

## 2020-11-01 RX ADMIN — LACTULOSE SCH GM: 20 SOLUTION ORAL at 17:02

## 2020-11-01 RX ADMIN — INSULIN ASPART SCH UNITS: 100 INJECTION, SOLUTION INTRAVENOUS; SUBCUTANEOUS at 12:03

## 2020-11-01 RX ADMIN — INSULIN ASPART SCH UNITS: 100 INJECTION, SOLUTION INTRAVENOUS; SUBCUTANEOUS at 17:08

## 2020-11-01 RX ADMIN — INSULIN ASPART SCH UNITS: 100 INJECTION, SOLUTION INTRAVENOUS; SUBCUTANEOUS at 12:01

## 2020-11-01 RX ADMIN — INSULIN DETEMIR SCH UNITS: 100 INJECTION, SOLUTION SUBCUTANEOUS at 08:53

## 2020-11-01 RX ADMIN — SPIRONOLACTONE SCH MG: 50 TABLET, FILM COATED ORAL at 08:54

## 2020-11-01 RX ADMIN — SODIUM CHLORIDE SCH MLS/HR: 9 INJECTION, SOLUTION INTRAVENOUS at 05:08

## 2020-11-01 RX ADMIN — INSULIN ASPART SCH UNITS: 100 INJECTION, SOLUTION INTRAVENOUS; SUBCUTANEOUS at 21:12

## 2020-11-01 RX ADMIN — INSULIN ASPART SCH UNITS: 100 INJECTION, SOLUTION INTRAVENOUS; SUBCUTANEOUS at 17:06

## 2020-11-01 RX ADMIN — SODIUM CHLORIDE SCH MLS/HR: 0.9 INJECTION INTRAVENOUS at 12:13

## 2020-11-01 RX ADMIN — LACTULOSE SCH GM: 20 SOLUTION ORAL at 08:45

## 2020-11-01 NOTE — GENERAL PROGRESS NOTE
Subjective


Constitutional:  Reports: weakness


Allergies:  


Coded Allergies:  


     No Known Allergies (Unverified , 10/18/20)


All Systems:  reviewed and negative except above


Subjective


sleepy calm in bed





Objective





Last 24 Hour Vital Signs








  Date Time  Temp Pulse Resp B/P (MAP) Pulse Ox O2 Delivery O2 Flow Rate FiO2


 


11/1/20 08:00 98.1 102 21 143/99 (114) 97   


 


11/1/20 04:00 98.1 81 20 150/99 (116) 96   


 


11/1/20 00:00 99.0 87 20 145/95 (112) 98   


 


10/31/20 21:00      Room Air  


 


10/31/20 20:00 99.1 81 20 146/101 (116) 97   


 


10/31/20 16:00 98.1 89 18 148/76 (100) 98   


 


10/31/20 11:34 96.0 97 17 142/86 (104) 96   

















Intake and Output  


 


 10/31/20 11/1/20





 19:00 07:00


 


Intake Total 3530.0 ml 


 


Balance 3530.0 ml 


 


  


 


Intake Oral 3200 ml 


 


IV Total 330.0 ml 


 


# Voids  2








Laboratory Tests


10/31/20 11:22: POC Whole Blood Glucose 313H


10/31/20 16:00: POC Whole Blood Glucose 293H


10/31/20 20:43: POC Whole Blood Glucose 257H


11/1/20 04:00: 


White Blood Count 11.4H, Red Blood Count 3.76L, Hemoglobin 13.2L, Hematocrit 

38.8L, Mean Corpuscular Volume 103H, Mean Corpuscular Hemoglobin 35.2H, Mean 

Corpuscular Hemoglobin Concent 34.1, Red Cell Distribution Width 12.9, Platelet 

Count 91L, Mean Platelet Volume 11.5H, Neutrophils (%) (Auto) , Lymphocytes (%) 

(Auto) , Monocytes (%) (Auto) , Eosinophils (%) (Auto) , Basophils (%) (Auto) , 

Neutrophils % (Manual) [Pending], Lymphocytes % (Manual) [Pending], Platelet 

Estimate [Pending], Platelet Morphology [Pending], Sodium Level 138, Potassium 

Level 3.5, Chloride Level 105, Carbon Dioxide Level 25, Anion Gap 8, Blood Urea 

Nitrogen 7, Creatinine 0.6, Estimat Glomerular Filtration Rate > 60, Glucose 

Level 139#H, Calcium Level 7.9L, Total Bilirubin 0.9, Aspartate Amino Transf 

(AST/SGOT) 79H, Alanine Aminotransferase (ALT/SGPT) 105H, Alkaline Phosphatase 

199H, Ammonia 60H, Total Protein 5.7L, Albumin 2.3L, Globulin 3.4, 

Albumin/Globulin Ratio 0.7L, Hepatitis A IgM Antibody [Pending], Hepatitis B 

Surface Antigen [Pending], Hepatitis B Core IgM Antibody [Pending], Hepatitis C 

Antibody [Pending]


Height (Feet):  5


Height (Inches):  5.00


Weight (Pounds):  250


General Appearance:  lethargic


EENT:  normal ENT inspection


Neck:  normal alignment


Cardiovascular:  normal peripheral pulses, normal rate, regular rhythm


Respiratory/Chest:  chest wall non-tender, lungs clear, normal breath sounds


Abdomen:  normal bowel sounds, non tender, soft


Extremities:  normal inspection


Edema:  no edema noted Arm (L), no edema noted Arm (R), no edema noted Leg (L), 

no edema noted Leg (R), no edema noted Pedal (L), no edema noted Pedal (R), no 

edema noted Generalized


Neurologic:  motor weakness


Skin:  normal pigmentation, warm/dry





Assessment/Plan


Problem List:  


(1) Hyperglycemia


ICD Codes:  R73.9 - Hyperglycemia, unspecified


SNOMED:  33489547


(2) Pneumonia due to COVID-19 virus


ICD Codes:  U07.1 - COVID-19; J12.89 - Other viral pneumonia


SNOMED:  454476859946356842


(3) Ascites due to alcoholic cirrhosis


ICD Codes:  K70.31 - Alcoholic cirrhosis of liver with ascites


SNOMED:  8102111703783768


(4) LGI bleed


ICD Codes:  K92.2 - Gastrointestinal hemorrhage, unspecified


SNOMED:  64058840


Status:  unchanged


Assessment/Plan:


o2 pulm tx prn  abx gi id hem eval cbc bmp am











Hieu Helton DO         Nov 1, 2020 09:09

## 2020-11-01 NOTE — NUR
NURSE NOTES:

Received report from JAMAAL Durbin. AAO x 4, on room air, ambulatory. No SOB or labored 
breathing. ABD distended noted. Consent sign obtained for paracentesis. IV site intact and 
patent. Bed locked, lowest position, side rails up, alarm on, call light within reach. Will 
continue to monitor.

## 2020-11-01 NOTE — NUR
NURSE NOTES:

RECEIVED PATIENT A/A/OX4, AMBULATES WITH STEADY GAIT. TOLERATING FOOD INTAKE WELL. NO C/O 
PAIN/DISCOMFORT NOTED. NO C/O N/V NOTED. REFUSED SCD'S THOUGH PATIENT AMBULATES FREQUENTLY. 
SKIN IS INTACT. OBSERVED FOR (+) COVID. PIV PATENT AND INTACT. HEPLOCK. KEPT BED IN THE 
LOWEST POSITION. SIDERAILS ARE UPX3, CALL LIGHT IS WITHIN REACH. BRAKES AND BED LOCK MODE. 
WILL CONT TO MONITOR.

## 2020-11-01 NOTE — CONSULTATION
History of Present Illness


General


Chief Complaint:  Abdominal Pain


Referring physician:  Danie


Reason for Consultation:  COVID 19





Present Illness


Allergies:  


Coded Allergies:  


     No Known Allergies (Unverified , 10/18/20)





Medication History


Scheduled PRN


Acetaminophen* (Acetaminophen 325MG Tablet*), 650 MG ORAL Q4H PRN for MILD PAIN 

(1-4), TEMP>101F, (Reported)


Ondansetron (Zofran), 4 MG ORAL Q6H PRN for Nausea & Vomiting, (Reported)





Discontinued Medications


Ascorbic Acid* (Vitamin C*), 500 MG ORAL DAILY, (Reported)


   Discontinued Reason: Therapy completed


Bisacodyl (Dulcolax), 10 MG RC, (Reported)


   Discontinued Reason: Therapy completed


Cranberry Fruit (Cranberry), 400 MG PO, (Reported)


   Discontinued Reason: Therapy completed


Dexamethasone (Dexamethasone), 10 GM MC, (Reported)


   Discontinued Reason: Therapy completed


Magnesium Hydroxide (Milk of Magnesia), 30 ML ORAL DAILY, (Reported)


   Discontinued Reason: Therapy completed


Multivitamin With Minerals (Multivitamins With Minerals*), 1 TAB ORAL DAILY, 

(Reported)


   Discontinued Reason: Therapy completed


Na Phos,M-B/Na Phos,Di-Ba* (Fleet Enema*), 133 ML RECTAL DAILY, (Reported)


   Discontinued Reason: Therapy completed


Zinc Sulfate (Zinc Sulfate), 220 MG ORAL DAILY, (Reported)


   Discontinued Reason: Therapy completed





Patient History


Healthcare decision maker





Resuscitation status





Advanced Directive on File








Physical Exam





Last 24 Hour Vital Signs








  Date Time  Temp Pulse Resp B/P (MAP) Pulse Ox O2 Delivery O2 Flow Rate FiO2


 


11/1/20 09:00      Room Air  


 


11/1/20 08:00 98.1 102 21 143/99 (114) 97   


 


11/1/20 04:00 98.1 81 20 150/99 (116) 96   


 


11/1/20 00:00 99.0 87 20 145/95 (112) 98   


 


10/31/20 21:00      Room Air  


 


10/31/20 20:00 99.1 81 20 146/101 (116) 97   


 


10/31/20 16:00 98.1 89 18 148/76 (100) 98   


 


10/31/20 11:34 96.0 97 17 142/86 (104) 96   

















Intake and Output  


 


 10/31/20 11/1/20





 19:00 07:00


 


Intake Total 3530.0 ml 


 


Balance 3530.0 ml 


 


  


 


Intake Oral 3200 ml 


 


IV Total 330.0 ml 


 


# Voids  2











Laboratory Tests








Test


 10/31/20


11:22 10/31/20


16:00 10/31/20


20:43 11/1/20


04:00


 


POC Whole Blood Glucose


 313 MG/DL


()  H 293 MG/DL


()  H 257 MG/DL


()  H 





 


White Blood Count


 


 


 


 11.4 K/UL


(4.8-10.8)  H


 


Red Blood Count


 


 


 


 3.76 M/UL


(4.70-6.10)  L


 


Hemoglobin


 


 


 


 13.2 G/DL


(14.2-18.0)  L


 


Hematocrit


 


 


 


 38.8 %


(42.0-52.0)  L


 


Mean Corpuscular Volume


 


 


 


 103 FL (80-99)


H


 


Mean Corpuscular Hemoglobin


 


 


 


 35.2 PG


(27.0-31.0)  H


 


Mean Corpuscular Hemoglobin


Concent 


 


 


 34.1 G/DL


(32.0-36.0)


 


Red Cell Distribution Width


 


 


 


 12.9 %


(11.6-14.8)


 


Platelet Count


 


 


 


 91 K/UL


(150-450)  L


 


Mean Platelet Volume


 


 


 


 11.5 FL


(6.5-10.1)  H


 


Neutrophils (%) (Auto)


 


 


 


 % (45.0-75.0)





 


Lymphocytes (%) (Auto)


 


 


 


 % (20.0-45.0)





 


Monocytes (%) (Auto)     % (1.0-10.0)  


 


Eosinophils (%) (Auto)     % (0.0-3.0)  


 


Basophils (%) (Auto)     % (0.0-2.0)  


 


Differential Total Cells


Counted 


 


 


 100  





 


Neutrophils % (Manual)    73 % (45-75)  


 


Lymphocytes % (Manual)    15 % (20-45)  L


 


Monocytes % (Manual)    10 % (1-10)  


 


Eosinophils % (Manual)    2 % (0-3)  


 


Basophils % (Manual)    0 % (0-2)  


 


Band Neutrophils    0 % (0-8)  


 


Platelet Estimate    Decreased  L


 


Platelet Morphology    Normal  


 


Hypochromasia    1+  


 


Macrocytosis    1+  


 


Sodium Level


 


 


 


 138 MMOL/L


(136-145)


 


Potassium Level


 


 


 


 3.5 MMOL/L


(3.5-5.1)


 


Chloride Level


 


 


 


 105 MMOL/L


()


 


Carbon Dioxide Level


 


 


 


 25 MMOL/L


(21-32)


 


Anion Gap


 


 


 


 8 mmol/L


(5-15)


 


Blood Urea Nitrogen


 


 


 


 7 mg/dL (7-18)





 


Creatinine


 


 


 


 0.6 MG/DL


(0.55-1.30)


 


Estimat Glomerular Filtration


Rate 


 


 


 > 60 mL/min


(>60)


 


Glucose Level


 


 


 


 139 MG/DL


()  #H


 


Calcium Level


 


 


 


 7.9 MG/DL


(8.5-10.1)  L


 


Total Bilirubin


 


 


 


 0.9 MG/DL


(0.2-1.0)


 


Aspartate Amino Transf


(AST/SGOT) 


 


 


 79 U/L (15-37)


H


 


Alanine Aminotransferase


(ALT/SGPT) 


 


 


 105 U/L


(12-78)  H


 


Alkaline Phosphatase


 


 


 


 199 U/L


()  H


 


Ammonia


 


 


 


 60 umol/L


(11-32)  H


 


Total Protein


 


 


 


 5.7 G/DL


(6.4-8.2)  L


 


Albumin


 


 


 


 2.3 G/DL


(3.4-5.0)  L


 


Globulin    3.4 g/dL  


 


Albumin/Globulin Ratio


 


 


 


 0.7 (1.0-2.7)


L


 


Hepatitis A IgM Antibody    Pending  


 


Hepatitis B Surface Antigen    Pending  


 


Hepatitis B Core IgM Antibody    Pending  


 


Hepatitis C Antibody    Pending  








Height (Feet):  5


Height (Inches):  5.00


Weight (Pounds):  250


Medications





Current Medications








 Medications


  (Trade)  Dose


 Ordered  Sig/Mana


 Route


 PRN Reason  Start Time


 Stop Time Status Last Admin


Dose Admin


 


 Ceftriaxone


 Sodium 1 gm/


 Dextrose  55 ml @ 


 110 mls/hr  Q24H


 IVPB


   10/31/20 12:00


 11/7/20 11:59  10/31/20 12:23





 


 Dextrose


  (Dextrose 50%)  25 ml  Q30M  PRN


 IV


 Hypoglycemia  10/31/20 00:30


 1/29/21 00:29   





 


 Dextrose


  (Dextrose 50%)  50 ml  Q30M  PRN


 IV


 Hypoglycemia  10/31/20 00:30


 1/29/21 00:29   





 


 Furosemide


  (Lasix)  20 mg  DAILY


 IV


   10/31/20 10:00


 11/30/20 09:59  11/1/20 08:37





 


 Insulin Aspart


  (NovoLOG)    BEFORE MEALS AND  HS


 SUBQ


   10/31/20 06:30


 1/29/21 06:29  11/1/20 05:40





 


 Insulin Aspart


  (NovoLOG)  6 units  NOVOTIAC


 SUBQ


   10/31/20 16:50


 1/29/21 16:49  11/1/20 05:40





 


 Insulin Detemir


  (Levemir)  18 units  DAILY


 SUBQ


   10/31/20 15:00


 1/29/21 14:59  11/1/20 08:53





 


 Iohexol


  (OMNIPAQUE-300


 100ml)  100 ml  NOW  PRN


 INJ


 Radiology Procedure  10/30/20 21:00


 11/1/20 20:59   





 


 Lactulose


  (Cephulac)  20 gm  BID


 ORAL


   11/1/20 09:00


 12/1/20 08:59  11/1/20 08:45





 


 Morphine Sulfate


  (Morphine


 Sulfate)  2 mg  Q4H  PRN


 IVP


 For Pain  10/31/20 00:15


 11/7/20 00:14  10/31/20 21:20





 


 Spironolactone


  (Aldactone)  50 mg  DAILY


 ORAL


   10/31/20 10:00


 11/30/20 09:59  11/1/20 08:54





 


 Vancomycin HCl


  (Vanco pharmacy


 to dose)  1 ea  DAILY  PRN


 MISC


 Per rx protocol  10/31/20 11:00


 11/30/20 10:59   





 


 Vancomycin HCl 1


 gm/Sodium Chloride  275 ml @ 


 183.708


 mls/hr  Q8HR


 IVPB


   10/31/20 22:00


 11/5/20 21:59  11/1/20 05:08














Assessment/Plan


Assessment/Plan:


Hematology Consultation





REQ MD: Chele Beal


RFC: low plts


DOS 11/1/2020





HPI


35-year-old male presents to ED for evaluation.  Brought in for blood in stool. 

Pain is dull, 7 out of 10, nonradiating.  States his abdomen is distended.  

Coming from skilled nursing facility.  Patient was recently admitted here for 

Covid pneumonia and subsequently discharged to facility.  Patient denies any 

fevers or chills.  Denies cough.  No other aggravating relieving factors.  De

nies any other associated symptoms, plt are 91, and gi consulted as well for hx 

alcoholic cirrhosis.





Coded Allergies:  


     No Known Allergies (Unverified , 10/18/20)





COVID-19 Screening


Contact w/high risk pt:  Yes


Experienced COVID-19 symptoms?:  Yes


COVID-19 Testing performed PTA:  Yes


COVID-19 Screening:  Positive COVID-19





Patient History


Past Medical History:  DM


Past Surgical History:  none


Pertinent Family History:  none


Social History:  Denies: smoking, alcohol use, drug use


Immunizations:  UTD


Reviewed Nursing Documentation:  PMH: Agreed; PSxH: Agreed 





Nursing Documentation-PMH


Hx Cardiac Problems:  No


Hx Diabetes:  Yes


Hx Cancer:  No


Hx Gastrointestinal Problems:  No - GERD


Hx Neurological Problems:  No





Review of Systems


All Other Systems:  negative except mentioned in HPI





Physical Exam


Sp02 EP Interpretation:  reviewed, normal


General:  no apparent distress


Head:  normocephalic, atraumatic


Eyes:  bilateral eye normal inspection, bilateral eye PERRL


ENT:  hearing grossly normal, normal pharynx, no angioedema, normal voice


Neck:  full range of motion, supple/symm/no masses


Respiratory:  chest non-tender, lungs clear, normal breath sounds, speaking full

sentences


Cardiovascular:  regular rate, rhythm, no edema


Gastrointestinal:  normal bowel sounds, no guarding


Rectal:  deferred


Genitourinary:  normal inspection, no CVA tenderness


Neurologic:  alert, motor strength/tone normal, oriented x3, sensory intact, 

responsive, speech normal


Skin:  no rash


Lymphatic:  no adenopathy





Labs: reviewed





Imaging: noted





Assessment and Recs


# Thrombocytopenia - potential causes multifactorial, does endorse a hx of 

alcoholic cirrhosis


--> hiv and hep thus far neg


--> CT 2.  Cirrhosis of the liver. 3.  Varices at the gastrohepatic ligament 

region consistent with  underlying portal venous hypertension.


--> Peripheral smear ordered to evaluate for blasts /schistocytes 


--> abx and other meds have been reviewed 


--> ok for ppx if plt >50k w/ either heparin or lovenox 


--> Transfuse if Plt < 20k and fever, or if Plt < 10k without fever 


# Anemia that is due to LGI bleed


--> r'o variceal bleed


--> per gi endscopy prn


--> ppi as needed


# Leukocytosis is due to Bilateral pna with COVID-19


--> ABX vanc/ctx


--> smear is reviewed


# Hyperglycemia


-> iss and accuchecks as needed


# Ascites due to alcoholic cirrhosis


# Dvt ppx scds





The timing of this note does not necessarily reflect the time of the patient was

seen.





Greatly appreciate consultation.











Gary Mclean MD           Nov 1, 2020 10:51

## 2020-11-01 NOTE — NUR
NURSE HAND-OFF: 



Important Events on Shift:consent for paracentesis pending

Patient Status: stable

Diet: CCHO M



Pending Orders: paracentesis

Pending Results/Labs:AM labs

Pending MD notification:N



Latest Vital Signs: Temperature 98.1 , Pulse 81 , B/P 150 /99 , Respiratory Rate 20 , O2 SAT 
96 , Room Air, O2 Flow Rate .  

Vital Sign Comment: []



Latest Wood Fall Score: 20  

Fall Risk: Low Risk 

Safety Measures: Call light Within Reach, Bed Alarm Zone 1, Side Rails Side Rails x2, Bed 
position Low and Locked.

Fall Precautions: 

Patient Fall Education

-------------------------------------------------------------------------------

Addendum: 11/01/20 at 0739 by HALLEY ROCA RN RN

-------------------------------------------------------------------------------

HAND-OFF: 

Report given to Gifty.

## 2020-11-01 NOTE — GENERAL PROGRESS NOTE
Subjective


Allergies:  


Coded Allergies:  


     No Known Allergies (Unverified , 10/18/20)


All Systems:  reviewed and negative except above


Subjective


events noted 


interval notes reviewed 


glucose values improved 











Item Value  Date Time


 


Bedside Blood Glucose 203 mg/dl H 11/1/20 1203


 


Bedside Blood Glucose 143 mg/dl H 11/1/20 0853


 


Bedside Blood Glucose 143 mg/dl H 11/1/20 0550


 


Bedside Blood Glucose 257 mg/dl H 10/31/20 2109


 


Bedside Blood Glucose 293 mg/dl H 10/31/20 1653


 


Bedside Blood Glucose 313 mg/dl H 10/31/20 1151











Objective





Last 24 Hour Vital Signs








  Date Time  Temp Pulse Resp B/P (MAP) Pulse Ox O2 Delivery O2 Flow Rate FiO2


 


11/1/20 09:00      Room Air  


 


11/1/20 08:00 98.1 102 21 143/99 (114) 97   


 


11/1/20 04:00 98.1 81 20 150/99 (116) 96   


 


11/1/20 00:00 99.0 87 20 145/95 (112) 98   


 


10/31/20 21:00      Room Air  


 


10/31/20 20:00 99.1 81 20 146/101 (116) 97   


 


10/31/20 16:00 98.1 89 18 148/76 (100) 98   

















Intake and Output  


 


 10/31/20 11/1/20





 19:00 07:00


 


Intake Total 3530.0 ml 


 


Balance 3530.0 ml 


 


  


 


Intake Oral 3200 ml 


 


IV Total 330.0 ml 


 


# Voids  2








Laboratory Tests


10/31/20 16:00: POC Whole Blood Glucose 293H


10/31/20 20:43: POC Whole Blood Glucose 257H


11/1/20 04:00: 


White Blood Count 11.4H, Red Blood Count 3.76L, Hemoglobin 13.2L, Hematocrit 

38.8L, Mean Corpuscular Volume 103H, Mean Corpuscular Hemoglobin 35.2H, Mean 

Corpuscular Hemoglobin Concent 34.1, Red Cell Distribution Width 12.9, Platelet 

Count 91L, Mean Platelet Volume 11.5H, Neutrophils (%) (Auto) , Lymphocytes (%) 

(Auto) , Monocytes (%) (Auto) , Eosinophils (%) (Auto) , Basophils (%) (Auto) , 

Differential Total Cells Counted 100, Neutrophils % (Manual) 73, Lymphocytes % 

(Manual) 15L, Monocytes % (Manual) 10, Eosinophils % (Manual) 2, Basophils % 

(Manual) 0, Band Neutrophils 0, Platelet Estimate DecreasedL, Platelet 

Morphology Normal, Hypochromasia 1+, Macrocytosis 1+, Sodium Level 138, [place]

m Level 3.5, Chloride Level 105, Carbon Dioxide Level 25, Anion Gap 8, Blood 

Urea Nitrogen 7, Creatinine 0.6, Estimat Glomerular Filtration Rate > 60, 

Glucose Level 139#H, Calcium Level 7.9L, Total Bilirubin 0.9, Aspartate Amino 

Transf (AST/SGOT) 79H, Alanine Aminotransferase (ALT/SGPT) 105H, Alkaline 

Phosphatase 199H, Ammonia 60H, Total Protein 5.7L, Albumin 2.3L, Globulin 3.4, 

Albumin/Globulin Ratio 0.7L, Hepatitis A IgM Antibody [Pending], Hepatitis B 

Surface Antigen [Pending], Hepatitis B Core IgM Antibody [Pending], Hepatitis C 

Antibody [Pending], HIV (1&2) Antibody Rapid Negative


11/1/20 11:57: POC Whole Blood Glucose [Pending]


11/1/20 12:40: Vancomycin Level Trough [Pending]


Height (Feet):  5


Height (Inches):  5.00


Weight (Pounds):  250


Objective





Current Medications








 Medications


  (Trade)  Dose


 Ordered  Sig/Mana


 Route


 PRN Reason  Start Time


 Stop Time Status Last Admin


Dose Admin


 


 Ceftriaxone


 Sodium 1 gm/


 Dextrose  55 ml @ 


 110 mls/hr  Q24H


 IVPB


   10/31/20 12:00


 11/7/20 11:59  11/1/20 12:13





 


 Dextrose


  (Dextrose 50%)  25 ml  Q30M  PRN


 IV


 Hypoglycemia  10/31/20 00:30


 1/29/21 00:29   





 


 Dextrose


  (Dextrose 50%)  50 ml  Q30M  PRN


 IV


 Hypoglycemia  10/31/20 00:30


 1/29/21 00:29   





 


 Furosemide


  (Lasix)  20 mg  DAILY


 IV


   10/31/20 10:00


 11/30/20 09:59  11/1/20 08:37





 


 Insulin Aspart


  (NovoLOG)    BEFORE MEALS AND  HS


 SUBQ


   10/31/20 06:30


 1/29/21 06:29  11/1/20 12:03





 


 Insulin Aspart


  (NovoLOG)  6 units  NOVOTIAC


 SUBQ


   10/31/20 16:50


 1/29/21 16:49  11/1/20 12:01





 


 Insulin Detemir


  (Levemir)  18 units  DAILY


 SUBQ


   10/31/20 15:00


 1/29/21 14:59  11/1/20 08:53





 


 Iohexol


  (OMNIPAQUE-300


 100ml)  100 ml  NOW  PRN


 INJ


 Radiology Procedure  10/30/20 21:00


 11/1/20 20:59   





 


 Lactulose


  (Cephulac)  20 gm  BID


 ORAL


   11/1/20 09:00


 12/1/20 08:59  11/1/20 08:45





 


 Morphine Sulfate


  (Morphine


 Sulfate)  2 mg  Q4H  PRN


 IVP


 For Pain  10/31/20 00:15


 11/7/20 00:14  10/31/20 21:20





 


 Pantoprazole


  (Protonix)  40 mg  EVERY 12  HOURS


 ORAL


   11/1/20 21:00


 12/1/20 20:59   





 


 Spironolactone


  (Aldactone)  50 mg  DAILY


 ORAL


   10/31/20 10:00


 11/30/20 09:59  11/1/20 08:54





 


 Vancomycin HCl


  (Vanco pharmacy


 to dose)  1 ea  DAILY  PRN


 MISC


 Per rx protocol  10/31/20 11:00


 11/30/20 10:59   





 


 Vancomycin HCl 1


 gm/Sodium Chloride  275 ml @ 


 183.708


 mls/hr  Q8HR


 IVPB


   10/31/20 22:00


 11/5/20 21:59  11/1/20 05:08














Assessment/Plan


Problem List:  


(1) COVID-19


ICD Codes:  U07.1 - COVID-19


SNOMED:  971863281


(2) Pneumonia due to COVID-19 virus


ICD Codes:  U07.1 - COVID-19; J12.89 - Other viral pneumonia


SNOMED:  036774503263411822


(3) Hyperglycemia


ICD Codes:  R73.9 - Hyperglycemia, unspecified


SNOMED:  16756885


Status:  unchanged


Assessment/Plan:


continue Levemir 18 units daily 


continue Novolog 6 units ac tid 


continue Novolog sliding scale ac / hs











Richardson Huber MD                  Nov 1, 2020 13:05

## 2020-11-01 NOTE — GENERAL PROGRESS NOTE
Subjective


Allergies:  


Coded Allergies:  


     No Known Allergies (Unverified , 10/18/20)





Objective





Last 24 Hour Vital Signs








  Date Time  Temp Pulse Resp B/P (MAP) Pulse Ox O2 Delivery O2 Flow Rate FiO2


 


11/1/20 04:00 98.1 81 20 150/99 (116) 96   


 


11/1/20 00:00 99.0 87 20 145/95 (112) 98   


 


10/31/20 21:00      Room Air  


 


10/31/20 20:00 99.1 81 20 146/101 (116) 97   


 


10/31/20 16:00 98.1 89 18 148/76 (100) 98   


 


10/31/20 11:34 96.0 97 17 142/86 (104) 96   


 


10/31/20 09:00      Room Air  

















Intake and Output  


 


 10/31/20 11/1/20





 19:00 07:00


 


Intake Total 3530.0 ml 


 


Balance 3530.0 ml 


 


  


 


Intake Oral 3200 ml 


 


IV Total 330.0 ml 


 


# Voids  2








Laboratory Tests


10/31/20 11:22: POC Whole Blood Glucose 313H


10/31/20 16:00: POC Whole Blood Glucose 293H


10/31/20 20:43: POC Whole Blood Glucose 257H


11/1/20 04:00: 


White Blood Count 11.4H, Red Blood Count 3.76L, Hemoglobin 13.2L, Hematocrit 

38.8L, Mean Corpuscular Volume 103H, Mean Corpuscular Hemoglobin 35.2H, Mean 

Corpuscular Hemoglobin Concent 34.1, Red Cell Distribution Width 12.9, Platelet 

Count 91L, Mean Platelet Volume 11.5H, Neutrophils (%) (Auto) , Lymphocytes (%) 

(Auto) , Monocytes (%) (Auto) , Eosinophils (%) (Auto) , Basophils (%) (Auto) , 

Neutrophils % (Manual) [Pending], Lymphocytes % (Manual) [Pending], Platelet 

Estimate [Pending], Platelet Morphology [Pending], Sodium Level 138, Potassium 

Level 3.5, Chloride Level 105, Carbon Dioxide Level 25, Anion Gap 8, Blood Urea 

Nitrogen 7, Creatinine 0.6, Estimat Glomerular Filtration Rate > 60, Glucose 

Level 139#H, Calcium Level 7.9L, Total Bilirubin 0.9, Aspartate Amino Transf 

(AST/SGOT) 79H, Alanine Aminotransferase (ALT/SGPT) 105H, Alkaline Phosphatase 

199H, Ammonia 60H, Total Protein 5.7L, Albumin 2.3L, Globulin 3.4, 

Albumin/Globulin Ratio 0.7L, Hepatitis A IgM Antibody [Pending], Hepatitis B 

Surface Antigen [Pending], Hepatitis B Core IgM Antibody [Pending], Hepatitis C 

Antibody [Pending]


Height (Feet):  5


Height (Inches):  5.00


Weight (Pounds):  250


General Appearance:  no apparent distress


EENT:  normal ENT inspection


Neck:  supple


Cardiovascular:  normal rate


Respiratory/Chest:  lungs clear


Abdomen:  normal bowel sounds, non tender, soft


Extremities:  non-tender





Assessment/Plan


Assessment/Plan:


cirrhosis


ascites


portal HTN


DM


elevated LFTS


Covid +


elevated ammonia level





hepatitis panel


lasix and aldactone


paracentesis to r/o SBP


repeat labs


add lactulose











Humberto Nielson MD              Nov 1, 2020 08:05

## 2020-11-01 NOTE — NUR
NURSE NOTES:

SPOKE WITH BROTHER WILSON. PROVIDED DR FLOWERS'S NUMBER PRIOR OBTAINING CONSENT. WILL CONT 
TO MONITOR.

-------------------------------------------------------------------------------

Addendum: 11/01/20 at 1739 by STEFFANIE PATEL LVN

-------------------------------------------------------------------------------

NURSE NOTES:

 still awaiting for GI MD to return his voicemessage. will cont to monitor.

## 2020-11-01 NOTE — NUR
NURSE HAND-OFF: 



Important Events on Shift:[OBTAINED CONSENT FOR PARACENTESIS, VSS, AFEBRILE, ACCUCHECK 
MONITORING]

Patient Status: [STABLE]

Diet: [[facility] MED]



Pending Orders: [LABS]

Pending Results/Labs:[IN AM ]

Pending MD notification:[]



Latest Vital Signs: Temperature 98.2 , Pulse 92 , B/P 153 /97 , Respiratory Rate 20 , O2 SAT 
99 , Room Air, O2 Flow Rate .  

Vital Sign Comment: []



Latest Wood Fall Score: 20  

Fall Risk: Low Risk 

Safety Measures: Call light Within Reach, Bed Alarm Zone 1, Side Rails Side Rails x2, Bed 
position Low and Locked.

Fall Precautions: 

Patient Fall Education



Report given to [HALLEY].

## 2020-11-02 VITALS — DIASTOLIC BLOOD PRESSURE: 86 MMHG | SYSTOLIC BLOOD PRESSURE: 148 MMHG

## 2020-11-02 VITALS — DIASTOLIC BLOOD PRESSURE: 72 MMHG | SYSTOLIC BLOOD PRESSURE: 145 MMHG

## 2020-11-02 VITALS — DIASTOLIC BLOOD PRESSURE: 76 MMHG | SYSTOLIC BLOOD PRESSURE: 134 MMHG

## 2020-11-02 VITALS — SYSTOLIC BLOOD PRESSURE: 144 MMHG | DIASTOLIC BLOOD PRESSURE: 93 MMHG

## 2020-11-02 VITALS — SYSTOLIC BLOOD PRESSURE: 150 MMHG | DIASTOLIC BLOOD PRESSURE: 97 MMHG

## 2020-11-02 VITALS — SYSTOLIC BLOOD PRESSURE: 144 MMHG | DIASTOLIC BLOOD PRESSURE: 98 MMHG

## 2020-11-02 LAB
ADD MANUAL DIFF: YES
ALBUMIN SERPL-MCNC: 2.4 G/DL (ref 3.4–5)
ALBUMIN/GLOB SERPL: 0.6 {RATIO} (ref 1–2.7)
ALP SERPL-CCNC: 198 U/L (ref 46–116)
ALT SERPL-CCNC: 118 U/L (ref 12–78)
AMMONIA PLAS-SCNC: 44 UMOL/L (ref 11–32)
ANION GAP SERPL CALC-SCNC: 8 MMOL/L (ref 5–15)
AST SERPL-CCNC: 101 U/L (ref 15–37)
BILIRUB SERPL-MCNC: 0.9 MG/DL (ref 0.2–1)
BUN SERPL-MCNC: 6 MG/DL (ref 7–18)
CALCIUM SERPL-MCNC: 8.3 MG/DL (ref 8.5–10.1)
CHLORIDE SERPL-SCNC: 109 MMOL/L (ref 98–107)
CO2 SERPL-SCNC: 24 MMOL/L (ref 21–32)
CREAT SERPL-MCNC: 0.8 MG/DL (ref 0.55–1.3)
ERYTHROCYTE [DISTWIDTH] IN BLOOD BY AUTOMATED COUNT: 12.9 % (ref 11.6–14.8)
GLOBULIN SER-MCNC: 3.8 G/DL
HCT VFR BLD CALC: 42 % (ref 42–52)
HGB BLD-MCNC: 14 G/DL (ref 14.2–18)
MCV RBC AUTO: 104 FL (ref 80–99)
PLATELET # BLD: 86 K/UL (ref 150–450)
POTASSIUM SERPL-SCNC: 4.2 MMOL/L (ref 3.5–5.1)
RBC # BLD AUTO: 4.03 M/UL (ref 4.7–6.1)
SODIUM SERPL-SCNC: 141 MMOL/L (ref 136–145)
WBC # BLD AUTO: 9.8 K/UL (ref 4.8–10.8)

## 2020-11-02 PROCEDURE — 0W9G3ZZ DRAINAGE OF PERITONEAL CAVITY, PERCUTANEOUS APPROACH: ICD-10-PCS

## 2020-11-02 RX ADMIN — INSULIN ASPART SCH UNITS: 100 INJECTION, SOLUTION INTRAVENOUS; SUBCUTANEOUS at 13:10

## 2020-11-02 RX ADMIN — INSULIN DETEMIR SCH UNITS: 100 INJECTION, SOLUTION SUBCUTANEOUS at 08:29

## 2020-11-02 RX ADMIN — INSULIN ASPART SCH UNITS: 100 INJECTION, SOLUTION INTRAVENOUS; SUBCUTANEOUS at 20:58

## 2020-11-02 RX ADMIN — MORPHINE SULFATE PRN MG: 2 INJECTION, SOLUTION INTRAMUSCULAR; INTRAVENOUS at 21:09

## 2020-11-02 RX ADMIN — INSULIN ASPART SCH UNITS: 100 INJECTION, SOLUTION INTRAVENOUS; SUBCUTANEOUS at 16:45

## 2020-11-02 RX ADMIN — INSULIN ASPART SCH UNITS: 100 INJECTION, SOLUTION INTRAVENOUS; SUBCUTANEOUS at 05:41

## 2020-11-02 RX ADMIN — LACTULOSE SCH GM: 20 SOLUTION ORAL at 08:27

## 2020-11-02 RX ADMIN — INSULIN ASPART SCH UNITS: 100 INJECTION, SOLUTION INTRAVENOUS; SUBCUTANEOUS at 05:42

## 2020-11-02 RX ADMIN — INSULIN ASPART SCH UNITS: 100 INJECTION, SOLUTION INTRAVENOUS; SUBCUTANEOUS at 13:09

## 2020-11-02 RX ADMIN — SODIUM CHLORIDE SCH MLS/HR: 0.9 INJECTION INTRAVENOUS at 11:24

## 2020-11-02 RX ADMIN — LACTULOSE SCH GM: 20 SOLUTION ORAL at 17:01

## 2020-11-02 RX ADMIN — SPIRONOLACTONE SCH MG: 50 TABLET, FILM COATED ORAL at 08:28

## 2020-11-02 RX ADMIN — INSULIN ASPART SCH UNITS: 100 INJECTION, SOLUTION INTRAVENOUS; SUBCUTANEOUS at 16:44

## 2020-11-02 NOTE — NUR
NURSE NOTES:

Received patient in bed, patient awake, alert, oriented x4 no sign of distress, on room air, 
ambulatory.noted abdomen  distended HL  intact and patent. Bed locked, lowest position, side 
rails up, alarm on, call light within reach. Will continue to monitor.



ASHWIN QUINONES

## 2020-11-02 NOTE — BRIEF OPERATIVE NOTE
Immediate Post Operative Note


Operative Note


Pre-op Diagnosis:


ascites


Procedure:


paracentesis


Surgeon:  JESS KAY


Anesthesia:  local


Specimen:  yes - 50 ml fluid sent to lab


Complications:  none


Fluids:  none


Implant(s) used?:  No











Avinash Kay MD                 Nov 2, 2020 15:07

## 2020-11-02 NOTE — NUR
RD ASSESSMENT & RECOMMENDATIONS

SEE CARE ACTIVITY FOR COMPLETE ASSESSMENT



DAILY ESTIMATED NEEDS:

Needs based on Liver, DM, pulmonary 68.6kg abw 

25-30  kcals/kg 

1715-2058  total kcals

1-1.5  g protein/kg

  g total protein 

25-30  mL/kg

1715-2058  total fluid mLs



NUTRITION DIAGNOSIS:

Altered nutrition related lab values r/t diabetes and cirrhosis as

evidenced by A1C 8.9,  on adm, w/ dx ascites and portal HTN, elev

ammonia (44) w/ elev LFT's/



CURRENT DIET: CCHO MED     





PO DIET RECOMMENDATIONS:

CCHO MED / CARDIAC diet                                                     





ADDITIONAL RECOMMENDATIONS:

1) Obtain accurate daily wts, on lasix 

2) Monitor lytes daily, replete as needed 

    -> Add B-complex 1 tab daily  

3) Monitor for hypoglycemia, no episodes at this time

## 2020-11-02 NOTE — NUR
NURSE HAND-OFF: 



Important Events on Shift:[US parasentesis done at bedside]

Patient Status: [stable]

Diet: [CCD M]



Pending Orders: [none]

Pending Results/Labs:[none]

Pending MD notification:[none]



Latest Vital Signs: Temperature 97.5 , Pulse 71 , B/P 145 /72 , Respiratory Rate 18 , O2 SAT 
96 , Room Air, O2 Flow Rate .  

Vital Sign Comment: [stable]



Latest Wood Fall Score: 20  

Fall Risk: Low Risk 

Safety Measures: Call light Within Reach, Bed Alarm Zone 1, Side Rails Side Rails x2, Bed 
position Low and Locked.

Fall Precautions: 

Patient Fall Education



Report given to [].

-------------------------------------------------------------------------------

Addendum: 11/02/20 at 1940 by WOLF VILLEGAS RN RN

-------------------------------------------------------------------------------

NURSE HAND-OFF: 



Important Events on Shift:[US parasentesis done at bedside]

Patient Status: [stable]

Diet: [CCD M]



Pending Orders: [none]

Pending Results/Labs:[none]

Pending MD notification:[none]



Latest Vital Signs: Temperature 97.5 , Pulse 71 , B/P 145 /72 , Respiratory Rate 18 , O2 SAT 
96 , Room Air, O2 Flow Rate .  

Vital Sign Comment: [stable]



Latest Wood Fall Score: 20  

Fall Risk: Low Risk 

Safety Measures: Call light Within Reach, Bed Alarm Zone 1, Side Rails Side Rails x2, Bed 
position Low and Locked.

Fall Precautions: 

Patient Fall Education



Report given to [MS JAMAL CHRISTOPHER].

## 2020-11-02 NOTE — INFECTIOUS DISEASES PROG NOTE
Assessment/Plan





Assessment/Plan:


36 yo male with with PMHx of DM and COVID 19 Dx 10/19/20 who presented to the ED

on 10/30/20 with blood in his stool and abdominal pain.





Leukocytosis- SP


Ascities- r/o SBP


    -11/2 sp paracentesis


  -CT abd/p:   There is moderate abdominal and pelvic ascites. Cirrhosis of the 

liver. Varices at the gastrohepatic ligament region consistent with underlying 

portal venous hypertension.  No other acute findings.


 





No fever


Abdominal Pain


COVID 19


     Stable on RA


     SARS COV 2 PCR Pos 10/18/20 and 10/30/20


     CXR  10/30/20  Improved B/L Infiltrates





Elevated LFts; increased


  -HIV ab screen neg, Acute hep panel p





DM


Cirrhosis  seen on CT





PLAN





- Cont Ceftriaxone #3 pending ascites fluid


-dc empiric Vancomycin #3 


- f/u Cx





- COVID 19 Iso for now- not candidate for Remdesivir according to [facility] COVID task

force internal guidelines- need to be on supplemental O2





- Monitor Respiratory status





- f/u GI recs for GI Bleed





Thank you for this consult. [facility] will continue to follow Mr. Hilliard 

with you.





Subjective


Allergies:  


Coded Allergies:  


     No Known Allergies (Unverified , 10/18/20)


afebrile


at RA


LFts increased





Objective





Last 24 Hour Vital Signs








  Date Time  Temp Pulse Resp B/P (MAP) Pulse Ox O2 Delivery O2 Flow Rate FiO2


 


11/2/20 15:48 97.5 71 18 145/72 (96) 96   


 


11/2/20 12:00 97.9 91 18 148/86 (106) 95   


 


11/2/20 08:21      Room Air  


 


11/2/20 07:56 97.7 88 20 150/97 (114) 95   


 


11/2/20 04:00 97.9 90 20 144/93 (110) 96   


 


11/2/20 00:00 98.1 81 18 144/98 (113) 96   


 


11/1/20 20:39      Room Air  


 


11/1/20 20:00 97.5 85 18 148/101 (117) 95   


 


11/1/20 16:00 98.2 92 20 153/97 (115) 99   








Height (Feet):  5


Height (Inches):  5.00


Weight (Pounds):  250


GEN: NAD on RA


HEENT: NCAT, MMM, EOMI, No Scleral icterus, 


Neck: No LAD, Supple


LUNGS: CTAB, No W


HEART: RRR, S1, S2. 


ABD: Soft, NT, Mild Distension


Skin: No rash,, Normal in color


NEURO: A/O x 4, No focal deficits





Microbiology








 Date/Time


Source Procedure


Growth Status





 


 10/30/20 22:13


Rectum - Final


NO CARBAPENEM-RESISTANT ENTEROBACTERI... Complete


 


 10/30/20 22:13


Rectal Mucosa VRE Culture - Final


Enterococcus Faecium - Vre Complete


 


 10/30/20 22:13


Nasal Nares MRSA Culture - Final


NO METHICILLIN RESISTANT STAPH AUREUS... Complete


 


 10/30/20 20:51


Nasopharynx SARS-CoV-2 RdRp Gene Assay - Final Complete











Laboratory Tests








Test


 11/1/20


17:00 11/1/20


21:04 11/2/20


04:00 11/2/20


05:35


 


POC Whole Blood Glucose Pending   Pending    Pending  


 


White Blood Count


 


 


 9.8 K/UL


(4.8-10.8) 





 


Red Blood Count


 


 


 4.03 M/UL


(4.70-6.10)  L 





 


Hemoglobin


 


 


 14.0 G/DL


(14.2-18.0)  L 





 


Hematocrit


 


 


 42.0 %


(42.0-52.0) 





 


Mean Corpuscular Volume


 


 


 104 FL (80-99)


H 





 


Mean Corpuscular Hemoglobin


 


 


 34.9 PG


(27.0-31.0)  H 





 


Mean Corpuscular Hemoglobin


Concent 


 


 33.5 G/DL


(32.0-36.0) 





 


Red Cell Distribution Width


 


 


 12.9 %


(11.6-14.8) 





 


Platelet Count


 


 


 86 K/UL


(150-450)  L 





 


Mean Platelet Volume


 


 


 11.9 FL


(6.5-10.1)  H 





 


Neutrophils (%) (Auto)


 


 


 % (45.0-75.0)


 





 


Lymphocytes (%) (Auto)


 


 


 % (20.0-45.0)


 





 


Monocytes (%) (Auto)    % (1.0-10.0)   


 


Eosinophils (%) (Auto)    % (0.0-3.0)   


 


Basophils (%) (Auto)    % (0.0-2.0)   


 


Differential Total Cells


Counted 


 


 100  


 





 


Neutrophils % (Manual)   65 % (45-75)   


 


Lymphocytes % (Manual)   27 % (20-45)   


 


Monocytes % (Manual)   6 % (1-10)   


 


Eosinophils % (Manual)   2 % (0-3)   


 


Basophils % (Manual)   0 % (0-2)   


 


Band Neutrophils   0 % (0-8)   


 


Platelet Estimate   Decreased  L 


 


Platelet Morphology   Normal   


 


Macrocytosis   1+   


 


Sodium Level


 


 


 141 MMOL/L


(136-145) 





 


Potassium Level


 


 


 4.2 MMOL/L


(3.5-5.1) 





 


Chloride Level


 


 


 109 MMOL/L


()  H 





 


Carbon Dioxide Level


 


 


 24 MMOL/L


(21-32) 





 


Anion Gap


 


 


 8 mmol/L


(5-15) 





 


Blood Urea Nitrogen


 


 


 6 mg/dL (7-18)


L 





 


Creatinine


 


 


 0.8 MG/DL


(0.55-1.30) 





 


Estimat Glomerular Filtration


Rate 


 


 > 60 mL/min


(>60) 





 


Glucose Level


 


 


 110 MG/DL


()  H 





 


Calcium Level


 


 


 8.3 MG/DL


(8.5-10.1)  L 





 


Total Bilirubin


 


 


 0.9 MG/DL


(0.2-1.0) 





 


Aspartate Amino Transf


(AST/SGOT) 


 


 101 U/L


(15-37)  H 





 


Alanine Aminotransferase


(ALT/SGPT) 


 


 118 U/L


(12-78)  H 





 


Alkaline Phosphatase


 


 


 198 U/L


()  H 





 


Ammonia


 


 


 44 umol/L


(11-32)  H 





 


Total Protein


 


 


 6.2 G/DL


(6.4-8.2)  L 





 


Albumin


 


 


 2.4 G/DL


(3.4-5.0)  L 





 


Globulin   3.8 g/dL   


 


Albumin/Globulin Ratio


 


 


 0.6 (1.0-2.7)


L 





 


Test


 11/2/20


11:22 11/2/20


13:00 


 





 


POC Whole Blood Glucose Pending     


 


Vancomycin Level Trough


 


 11.2 ug/mL


(5.0-12.0) 


 














Current Medications








 Medications


  (Trade)  Dose


 Ordered  Sig/Mana


 Route


 PRN Reason  Start Time


 Stop Time Status Last Admin


Dose Admin


 


 Ceftriaxone


 Sodium 1 gm/


 Dextrose  55 ml @ 


 110 mls/hr  Q24H


 IVPB


   10/31/20 12:00


 11/7/20 11:59  11/2/20 11:24





 


 Dextrose


  (Dextrose 50%)  25 ml  Q30M  PRN


 IV


 Hypoglycemia  10/31/20 00:30


 1/29/21 00:29   





 


 Dextrose


  (Dextrose 50%)  50 ml  Q30M  PRN


 IV


 Hypoglycemia  10/31/20 00:30


 1/29/21 00:29   





 


 Furosemide


  (Lasix)  20 mg  DAILY


 IV


   10/31/20 10:00


 11/30/20 09:59  11/2/20 08:27





 


 Insulin Aspart


  (NovoLOG)    BEFORE MEALS AND  HS


 SUBQ


   10/31/20 06:30


 1/29/21 06:29  11/2/20 13:09





 


 Insulin Aspart


  (NovoLOG)  6 units  NOVOTIAC


 SUBQ


   10/31/20 16:50


 1/29/21 16:49  11/2/20 13:10





 


 Insulin Detemir


  (Levemir)  18 units  DAILY


 SUBQ


   10/31/20 15:00


 1/29/21 14:59  11/2/20 08:29





 


 Lactulose


  (Cephulac)  20 gm  BID


 ORAL


   11/1/20 09:00


 12/1/20 08:59  11/2/20 08:27





 


 Morphine Sulfate


  (Morphine


 Sulfate)  2 mg  Q4H  PRN


 IVP


 For Pain  10/31/20 00:15


 11/7/20 00:14  10/31/20 21:20





 


 Pantoprazole


  (Protonix)  40 mg  EVERY 12  HOURS


 ORAL


   11/1/20 21:00


 12/1/20 20:59  11/2/20 08:28





 


 Spironolactone


  (Aldactone)  50 mg  DAILY


 ORAL


   10/31/20 10:00


 11/30/20 09:59  11/2/20 08:28





 


 Vancomycin HCl  250 ml @ 


 167.007


 mls/hr  Q8HR


 IVPB


   11/1/20 14:00


 11/6/20 13:59  11/2/20 14:25





 


 Vancomycin HCl


  ([facility] pharmacy


 to dose)  1 ea  DAILY  PRN


 MISC


 Per rx protocol  10/31/20 11:00


 11/30/20 10:59   




















Sun Mishra M.D.             Nov 2, 2020 16:06

## 2020-11-02 NOTE — DIAGNOSTIC IMAGING REPORT
Indications: Ascites

 

Technique: Ultrasound used to localize optimal puncture site. Sterile prepping and

draping left lower pole.  Local anesthesia with 1% lidocaine. Under real-time

ultrasound guidance, puncture peritoneal space using paracentesis needle. Stylet

removed. Catheter placed to vacuum bottle suction. Total 1.25 liters of fluid

aspirated. Patient tolerated procedure well, without immediate complication.

 

Findings: Followup sonography demonstrates complete resolution of peritoneal fluid.

 

Impression: Successful ultrasound-guided paracentesis, yielding 1.25 liters of fluid

## 2020-11-02 NOTE — NUR
NURSE NOTES:

Received report from sandie denson. patient on bed, asleep. on room air. no sob. with right ac 
iv access, saline lock. per gold" patient is s/p paracentesis with insertion site on the left 
side; 1250 ml.denies any pain or discomfort.  bed locked and in lowest position. call light 
and light button within easy reach. will continue plan of care.

## 2020-11-02 NOTE — PRE-PROCEDURE NOTE/ATTESTATION
Pre-Procedure Note/Attestation


Complete Prior to Procedure


Planned Procedure:  not applicable


Procedure Narrative:


paracentesis





Indications for Procedure


Pre-Operative Diagnosis:


ascites





Attestation


I attest that I discussed the nature of the procedure; its benefits; risks and 

complications; and alternatives (and the risks and benefits of such 

alternatives), prior to the procedure, with the patient (or the patient's legal 

representative).





I attest that, if there was a reasonable possibility of needing a blood strauss

sfusion, the patient (or the patient's legal representative) was given the 

[facility] standardized written summary, pursuant 

to the Shabbir Doyle Blood Safety Act ([place] Health and Safety Code # 1645, as 

amended).





I attest that I re-evaluated the patient just prior to the surgery and that 

there has been no change in the patient's H&P, except as documented below:











Avinash Kay MD                 Nov 2, 2020 15:05

## 2020-11-02 NOTE — HEMATOLOGY/ONC PROGRESS NOTE
Assessment/Plan


Assessment/Plan





Assessment and Recs


# Thrombocytopenia - potential causes multifactorial, does endorse a hx of 

alcoholic cirrhosis


--> hiv and hep thus far neg


--> CT 2.  Cirrhosis of the liver. 3.  Varices at the gastrohepatic ligament 

region consistent with  underlying portal venous hypertension.


--> Peripheral smear ordered to evaluate for blasts /schistocytes 


--> abx and other meds have been reviewed 


--> ok for ppx if plt >50k w/ either heparin or lovenox 


--> Transfuse if Plt < 20k and fever, or if Plt < 10k without fever 


-> pl;t 83-->91


# Anemia that is due to LGI bleed


--> r'o variceal bleed


--> per gi endscopy prn


--> ppi as needed


# Leukocytosis is due to Bilateral pna with COVID-19


--> ABX vanc/ctx


--> smear is reviewed


# Hyperglycemia


-> iss and accuchecks as needed


# Ascites due to alcoholic cirrhosis


# Dvt ppx scds





The timing of this note does not necessarily reflect the time of the patient was

seen.





Greatly appreciate consultation.





Subjective


HEENT:  Denies: no symptoms, eye pain, blurred vision, tearing, double vision, 

ear pain, ear discharge, nose pain, nose congestion, throat pain, throat 

swelling, mouth pain, mouth swelling, other


Cardiovascular:  Denies: no symptoms, chest pain, edema, irregular heart rate, 

lightheadedness, palpitations, syncope, other


Respiratory:  Denies: no symptoms, cough, shortness of breath, SOB with 

excertion, SOB at rest, sputum, wheezing, other


Gastrointestinal/Abdominal:  Denies: no symptoms, abdomen distended, abdominal 

pain, black stools, tarry stools, blood in stool, constipated, diarrhea, 

difficulty swallowing, nausea, poor appetite, poor fluid intake, rectal blee

ding, vomiting, other


Genitourinary:  Denies: no symptoms, burning, discharge, frequency, flank pain, 

hematuria, incontinence, pain, urgency, other


Neurologic/Psychiatric:  Denies: no symptoms, anxiety, depressed, emotional 

problems, headache, numbness, paresthesia, pre-existing deficit, seizure, 

tingling, tremors, weakness, other


Endocrine:  Denies: no symptoms, excessive sweating, flushing, intolerance to 

cold, intolerance to heat, increased hunger, increased thirst, increased urine, 

unexplained weight gain, unexplained weight loss, other


Hematologic/Lymphatic:  Denies: no symptoms, anemia, easy bleeding, easy 

bruising, adenopathy, other


Allergies:  


Coded Allergies:  


     No Known Allergies (Unverified , 10/18/20)


Subjective


11/2 for paracentesis to be done, no bleeding, no night sweats





Objective


Objective





Current Medications








 Medications


  (Trade)  Dose


 Ordered  Sig/Mana


 Route


 PRN Reason  Start Time


 Stop Time Status Last Admin


Dose Admin


 


 Ceftriaxone


 Sodium 1 gm/


 Dextrose  55 ml @ 


 110 mls/hr  Q24H


 IVPB


   10/31/20 12:00


 11/7/20 11:59  11/1/20 12:13





 


 Dextrose


  (Dextrose 50%)  25 ml  Q30M  PRN


 IV


 Hypoglycemia  10/31/20 00:30


 1/29/21 00:29   





 


 Dextrose


  (Dextrose 50%)  50 ml  Q30M  PRN


 IV


 Hypoglycemia  10/31/20 00:30


 1/29/21 00:29   





 


 Furosemide


  (Lasix)  20 mg  DAILY


 IV


   10/31/20 10:00


 11/30/20 09:59  11/1/20 08:37





 


 Insulin Aspart


  (NovoLOG)    BEFORE MEALS AND  HS


 SUBQ


   10/31/20 06:30


 1/29/21 06:29  11/1/20 21:12





 


 Insulin Aspart


  (NovoLOG)  6 units  NOVOTIAC


 SUBQ


   10/31/20 16:50


 1/29/21 16:49  11/1/20 17:08





 


 Insulin Detemir


  (Levemir)  18 units  DAILY


 SUBQ


   10/31/20 15:00


 1/29/21 14:59  11/1/20 08:53





 


 Lactulose


  (Cephulac)  20 gm  BID


 ORAL


   11/1/20 09:00


 12/1/20 08:59  11/1/20 17:02





 


 Morphine Sulfate


  (Morphine


 Sulfate)  2 mg  Q4H  PRN


 IVP


 For Pain  10/31/20 00:15


 11/7/20 00:14  10/31/20 21:20





 


 Pantoprazole


  (Protonix)  40 mg  EVERY 12  HOURS


 ORAL


   11/1/20 21:00


 12/1/20 20:59  11/1/20 21:01





 


 Spironolactone


  (Aldactone)  50 mg  DAILY


 ORAL


   10/31/20 10:00


 11/30/20 09:59  11/1/20 08:54





 


 Vancomycin HCl  250 ml @ 


 167.007


 mls/hr  Q8HR


 IVPB


   11/1/20 14:00


 11/6/20 13:59  11/2/20 05:28





 


 Vancomycin HCl


  ([facility] pharmacy


 to dose)  1 ea  DAILY  PRN


 MISC


 Per rx protocol  10/31/20 11:00


 11/30/20 10:59   














Last 24 Hour Vital Signs








  Date Time  Temp Pulse Resp B/P (MAP) Pulse Ox O2 Delivery O2 Flow Rate FiO2


 


11/2/20 04:00 97.9 90 20 144/93 (110) 96   


 


11/2/20 00:00 98.1 81 18 144/98 (113) 96   


 


11/1/20 20:39      Room Air  


 


11/1/20 20:00 97.5 85 18 148/101 (117) 95   


 


11/1/20 16:00 98.2 92 20 153/97 (115) 99   


 


11/1/20 12:00 98.2 97 20 147/101 (116) 96   


 


11/1/20 09:00      Room Air  


 


11/1/20 08:00 98.1 102 21 143/99 (114) 97   


 


11/1/20 04:00 98.1 81 20 150/99 (116) 96   


 


11/1/20 00:00 99.0 87 20 145/95 (112) 98   


 


10/31/20 21:00      Room Air  


 


10/31/20 20:00 99.1 81 20 146/101 (116) 97   


 


10/31/20 16:00 98.1 89 18 148/76 (100) 98   


 


10/31/20 11:34 96.0 97 17 142/86 (104) 96   


 


10/31/20 09:00      Room Air  


 


10/31/20 08:00 98.2 78 18 147/98 (114) 97   

















Intake and Output  


 


 11/1/20 11/2/20





 19:00 07:00


 


Intake Total 590 ml 


 


Balance 590 ml 


 


  


 


Intake Oral 480 ml 


 


IV Total 110 ml 


 


# Voids 5 2


 


# Bowel Movements 2 2











Labs








Test


 10/30/20


20:15 10/30/20


20:55 10/30/20


22:52 10/31/20


05:26


 


White Blood Count


 12.9 K/UL


(4.8-10.8) 


 


 





 


Red Blood Count


 4.28 M/UL


(4.70-6.10) 


 


 





 


Hemoglobin


 15.0 G/DL


(14.2-18.0) 


 


 





 


Hematocrit


 45.9 %


(42.0-52.0) 


 


 





 


Mean Corpuscular Volume 107 FL (80-99)    


 


Mean Corpuscular Hemoglobin


 35.0 PG


(27.0-31.0) 


 


 





 


Mean Corpuscular Hemoglobin


Concent 32.7 G/DL


(32.0-36.0) 


 


 





 


Red Cell Distribution Width


 13.1 %


(11.6-14.8) 


 


 





 


Platelet Count


 83 K/UL


(150-450) 


 


 





 


Mean Platelet Volume


 14.2 FL


(6.5-10.1) 


 


 





 


Neutrophils (%) (Auto)  % (45.0-75.0)    


 


Lymphocytes (%) (Auto)  % (20.0-45.0)    


 


Monocytes (%) (Auto)  % (1.0-10.0)    


 


Eosinophils (%) (Auto)  % (0.0-3.0)    


 


Basophils (%) (Auto)  % (0.0-2.0)    


 


Differential Total Cells


Counted 100 


 


 


 





 


Neutrophils % (Manual) 73 % (45-75)    


 


Lymphocytes % (Manual) 18 % (20-45)    


 


Monocytes % (Manual) 6 % (1-10)    


 


Eosinophils % (Manual) 3 % (0-3)    


 


Basophils % (Manual) 0 % (0-2)    


 


Band Neutrophils 0 % (0-8)    


 


Platelet Estimate Decreased    


 


Platelet Morphology Normal    


 


Macrocytosis 1+    


 


Prothrombin Time


 12.7 SEC


(9.30-11.50) 


 


 





 


Prothromb Time International


Ratio 1.2 (0.9-1.1) 


 


 


 





 


Activated Partial


Thromboplast Time 26 SEC (23-33) 


 


 


 





 


Sodium Level


 134 MMOL/L


(136-145) 


 


 





 


Potassium Level


 3.9 MMOL/L


(3.5-5.1) 


 


 





 


Chloride Level


 101 MMOL/L


() 


 


 





 


Carbon Dioxide Level


 30 MMOL/L


(21-32) 


 


 





 


Anion Gap


 4 mmol/L


(5-15) 


 


 





 


Blood Urea Nitrogen


 11 mg/dL


(7-18) 


 


 





 


Creatinine


 1.0 MG/DL


(0.55-1.30) 


 


 





 


Estimat Glomerular Filtration


Rate > 60 mL/min


(>60) 


 


 





 


Glucose Level


 504 MG/DL


() 


 


 





 


Calcium Level


 7.9 MG/DL


(8.5-10.1) 


 


 





 


Total Bilirubin


 0.7 MG/DL


(0.2-1.0) 


 


 





 


Aspartate Amino Transf


(AST/SGOT) 77 U/L (15-37) 


 


 


 





 


Alanine Aminotransferase


(ALT/SGPT) 125 U/L


(12-78) 


 


 





 


Alkaline Phosphatase


 296 U/L


() 


 


 





 


Total Protein


 5.8 G/DL


(6.4-8.2) 


 


 





 


Albumin


 2.5 G/DL


(3.4-5.0) 


 


 





 


Globulin 3.3 g/dL    


 


Albumin/Globulin Ratio 0.8 (1.0-2.7)    


 


Lipase


 368 U/L


() 


 


 





 


Urine Color  Pale yellow   


 


Urine Appearance  Clear   


 


Urine pH  6.5 (4.5-8.0)   


 


Urine Specific Gravity


 


 1.005


(1.005-1.035) 


 





 


Urine Protein


 


 Negative


(NEGATIVE) 


 





 


Urine Glucose (UA)  4+ (NEGATIVE)   


 


Urine Ketones


 


 Negative


(NEGATIVE) 


 





 


Urine Blood


 


 Negative


(NEGATIVE) 


 





 


Urine Nitrite


 


 Negative


(NEGATIVE) 


 





 


Urine Bilirubin


 


 Negative


(NEGATIVE) 


 





 


Urine Urobilinogen


 


 Normal MG/DL


(0.0-1.0) 


 





 


Urine Leukocyte Esterase


 


 Negative


(NEGATIVE) 


 





 


POC Whole Blood Glucose


 


 


 338 MG/DL


() 259 MG/DL


()


 


Test


 10/31/20


06:00 10/31/20


11:22 10/31/20


16:00 10/31/20


20:43


 


White Blood Count


 13.0 K/UL


(4.8-10.8) 


 


 





 


Red Blood Count


 3.84 M/UL


(4.70-6.10) 


 


 





 


Hemoglobin


 13.3 G/DL


(14.2-18.0) 


 


 





 


Hematocrit


 39.9 %


(42.0-52.0) 


 


 





 


Mean Corpuscular Volume 104 FL (80-99)    


 


Mean Corpuscular Hemoglobin


 34.6 PG


(27.0-31.0) 


 


 





 


Mean Corpuscular Hemoglobin


Concent 33.3 G/DL


(32.0-36.0) 


 


 





 


Red Cell Distribution Width


 12.9 %


(11.6-14.8) 


 


 





 


Platelet Count


 83 K/UL


(150-450) 


 


 





 


Mean Platelet Volume


 14.5 FL


(6.5-10.1) 


 


 





 


Neutrophils (%) (Auto)  % (45.0-75.0)    


 


Lymphocytes (%) (Auto)  % (20.0-45.0)    


 


Monocytes (%) (Auto)  % (1.0-10.0)    


 


Eosinophils (%) (Auto)  % (0.0-3.0)    


 


Basophils (%) (Auto)  % (0.0-2.0)    


 


Differential Total Cells


Counted 100 


 


 


 





 


Neutrophils % (Manual) 71 % (45-75)    


 


Lymphocytes % (Manual) 28 % (20-45)    


 


Monocytes % (Manual) 1 % (1-10)    


 


Eosinophils % (Manual) 0 % (0-3)    


 


Basophils % (Manual) 0 % (0-2)    


 


Band Neutrophils 0 % (0-8)    


 


Platelet Estimate Decreased    


 


Platelet Morphology Normal    


 


Macrocytosis 1+    


 


Sodium Level


 139 MMOL/L


(136-145) 


 


 





 


Potassium Level


 3.7 MMOL/L


(3.5-5.1) 


 


 





 


Chloride Level


 106 MMOL/L


() 


 


 





 


Carbon Dioxide Level


 28 MMOL/L


(21-32) 


 


 





 


Anion Gap


 5 mmol/L


(5-15) 


 


 





 


Blood Urea Nitrogen 9 mg/dL (7-18)    


 


Creatinine


 0.8 MG/DL


(0.55-1.30) 


 


 





 


Estimat Glomerular Filtration


Rate > 60 mL/min


(>60) 


 


 





 


Glucose Level


 264 MG/DL


() 


 


 





 


Hemoglobin A1c


 8.9 %


(4.3-6.0) 


 


 





 


Calcium Level


 7.8 MG/DL


(8.5-10.1) 


 


 





 


Total Bilirubin


 0.6 MG/DL


(0.2-1.0) 


 


 





 


Aspartate Amino Transf


(AST/SGOT) 66 U/L (15-37) 


 


 


 





 


Alanine Aminotransferase


(ALT/SGPT) 102 U/L


(12-78) 


 


 





 


Alkaline Phosphatase


 236 U/L


() 


 


 





 


Total Protein


 5.1 G/DL


(6.4-8.2) 


 


 





 


Albumin


 2.1 G/DL


(3.4-5.0) 


 


 





 


Globulin 3.0 g/dL    


 


Albumin/Globulin Ratio 0.7 (1.0-2.7)    


 


POC Whole Blood Glucose


 


 313 MG/DL


() 293 MG/DL


() 257 MG/DL


()


 


Test


 11/1/20


04:00 11/1/20


11:57 11/1/20


12:40 11/1/20


17:00


 


White Blood Count


 11.4 K/UL


(4.8-10.8) 


 


 





 


Red Blood Count


 3.76 M/UL


(4.70-6.10) 


 


 





 


Hemoglobin


 13.2 G/DL


(14.2-18.0) 


 


 





 


Hematocrit


 38.8 %


(42.0-52.0) 


 


 





 


Mean Corpuscular Volume 103 FL (80-99)    


 


Mean Corpuscular Hemoglobin


 35.2 PG


(27.0-31.0) 


 


 





 


Mean Corpuscular Hemoglobin


Concent 34.1 G/DL


(32.0-36.0) 


 


 





 


Red Cell Distribution Width


 12.9 %


(11.6-14.8) 


 


 





 


Platelet Count


 91 K/UL


(150-450) 


 


 





 


Mean Platelet Volume


 11.5 FL


(6.5-10.1) 


 


 





 


Neutrophils (%) (Auto)  % (45.0-75.0)    


 


Lymphocytes (%) (Auto)  % (20.0-45.0)    


 


Monocytes (%) (Auto)  % (1.0-10.0)    


 


Eosinophils (%) (Auto)  % (0.0-3.0)    


 


Basophils (%) (Auto)  % (0.0-2.0)    


 


Differential Total Cells


Counted 100 


 


 


 





 


Neutrophils % (Manual) 73 % (45-75)    


 


Lymphocytes % (Manual) 15 % (20-45)    


 


Monocytes % (Manual) 10 % (1-10)    


 


Eosinophils % (Manual) 2 % (0-3)    


 


Basophils % (Manual) 0 % (0-2)    


 


Band Neutrophils 0 % (0-8)    


 


Platelet Estimate Decreased    


 


Platelet Morphology Normal    


 


Hypochromasia 1+    


 


Macrocytosis 1+    


 


Sodium Level


 138 MMOL/L


(136-145) 


 


 





 


Potassium Level


 3.5 MMOL/L


(3.5-5.1) 


 


 





 


Chloride Level


 105 MMOL/L


() 


 


 





 


Carbon Dioxide Level


 25 MMOL/L


(21-32) 


 


 





 


Anion Gap


 8 mmol/L


(5-15) 


 


 





 


Blood Urea Nitrogen 7 mg/dL (7-18)    


 


Creatinine


 0.6 MG/DL


(0.55-1.30) 


 


 





 


Estimat Glomerular Filtration


Rate > 60 mL/min


(>60) 


 


 





 


Glucose Level


 139 MG/DL


() 


 


 





 


Calcium Level


 7.9 MG/DL


(8.5-10.1) 


 


 





 


Total Bilirubin


 0.9 MG/DL


(0.2-1.0) 


 


 





 


Aspartate Amino Transf


(AST/SGOT) 79 U/L (15-37) 


 


 


 





 


Alanine Aminotransferase


(ALT/SGPT) 105 U/L


(12-78) 


 


 





 


Alkaline Phosphatase


 199 U/L


() 


 


 





 


Ammonia


 60 umol/L


(11-32) 


 


 





 


Total Protein


 5.7 G/DL


(6.4-8.2) 


 


 





 


Albumin


 2.3 G/DL


(3.4-5.0) 


 


 





 


Globulin 3.4 g/dL    


 


Albumin/Globulin Ratio 0.7 (1.0-2.7)    


 


HIV (1&2) Antibody Rapid


 Negative


(NEGATIVE) 


 


 





 


Vancomycin Level Trough


 


 


 5.9 ug/mL


(5.0-12.0) 





 


Test


 11/1/20


21:04 11/2/20


05:35 


 











Height (Feet):  5


Height (Inches):  5.00


Weight (Pounds):  250


Objective





Physical Exam


Sp02 EP Interpretation:  reviewed, normal


General:  no apparent distress


Head:  normocephalic, atraumatic


Eyes:  bilateral eye normal inspection, bilateral eye PERRL


ENT:  hearing grossly normal, normal pharynx, no angioedema, normal voice


Neck:  full range of motion, supple/symm/no masses


Respiratory:  chest non-tender, lungs clear, normal breath sounds, speaking full

sentences


Cardiovascular:  regular rate, rhythm, no edema


Gastrointestinal:  normal bowel sounds, no guarding


Rectal:  deferred


Genitourinary:  normal inspection, no CVA tenderness


Neurologic:  alert, motor strength/tone normal, oriented x3, sensory intact, 

responsive, speech normal


Skin:  no rash


Lymphatic:  no adenopathy











Gary Mclean MD           Nov 2, 2020 07:13

## 2020-11-02 NOTE — NUR
CASE MANAGEMENT:REVIEW



11/2/20



SI;ASCITES. COVID PNEUMONIA. 

S/P PARACENTESIS TODAY ~ 50 ML ASPIRATED

98.1   91   20   150/97   95% ON RA

        AMMONIA 44    ALB 2.1



IS;PROTONIX PO Q12

VANCOMYCIN IV

LACTULOSE PO BID

ROCEPHIN IV

ALDACTONE PO

LASIX IV



*****MED SURG STATUS****

DCP;FROM [facility]



*************************************************************************************



CASE MANAGEMENT:REVIEW



11/1/20



SI;HYPERGLYCEMIA. COVID PNEUMONIA. ASCITES.

98.2   102   21   153/97   95% ON RA

WBC 11.4     AST 79      ALP  199   AMMONIA 60   ALB 2.3



IS;LASIX IV

ALDACTONE O

ROCEPHIN IV

INSULIN LEVEMIR SUBQ

VANCOMYCIN IV

LACTULOSE

PROTONIX PO



****MED SURG STATUS****

DCP;FROM [facility]



************************************************************************************



CASE MANAGEMENT:INITIAL REVIEW



10/31/20



35 YR OLD MALE BIBA FROM [facility]



CC;ABDOMINAL PAIN



SI;LOWER GI BLEED. COVID PNEUMONIA.

98.4   82   17   136/71   97% ON RA

WBC 12.9   PLT 83         CA 7.9

AST 77         ALB 2.5

PT 12.7   INR 1.2 

UA+ GLUCOSE

COVID RAPID ~ POSITIVE

ABD/PELVIS CT ~ 1.  There is moderate abdominal and pelvic ascites.

2.  Cirrhosis of the liver.

3.  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 

underlying portal venous hypertension.

4.  No other acute findings.

CXR ~  Interval improvement with decrease in bilateral pneumonia.



IS;IVF NS BOLUS

INSULIN REGULAR IV



****ADMITTED TO MED SURG****

*****MED SURG STATUS*****

DCP;FROM [facility]

## 2020-11-02 NOTE — GENERAL PROGRESS NOTE
Subjective


Allergies:  


Coded Allergies:  


     No Known Allergies (Unverified , 10/18/20)


All Systems:  reviewed and negative except above


Subjective


events noted 


interval notes reviewed 


glucose values improved 











Item Value  Date Time


 


Bedside Blood Glucose 112 mg/dl 11/2/20 0542


 


Bedside Blood Glucose 220 mg/dl H 11/1/20 2112


 


Bedside Blood Glucose 162 mg/dl H 11/1/20 1708


 


Bedside Blood Glucose 203 mg/dl H 11/1/20 1203


 


Bedside Blood Glucose 143 mg/dl H 11/1/20 0853


 


Bedside Blood Glucose 143 mg/dl H 11/1/20 0550











Objective





Last 24 Hour Vital Signs








  Date Time  Temp Pulse Resp B/P (MAP) Pulse Ox O2 Delivery O2 Flow Rate FiO2


 


11/2/20 04:00 97.9 90 20 144/93 (110) 96   


 


11/2/20 00:00 98.1 81 18 144/98 (113) 96   


 


11/1/20 20:39      Room Air  


 


11/1/20 20:00 97.5 85 18 148/101 (117) 95   


 


11/1/20 16:00 98.2 92 20 153/97 (115) 99   


 


11/1/20 12:00 98.2 97 20 147/101 (116) 96   


 


11/1/20 09:00      Room Air  


 


11/1/20 08:00 98.1 102 21 143/99 (114) 97   

















Intake and Output  


 


 11/1/20 11/2/20





 19:00 07:00


 


Intake Total 590 ml 


 


Balance 590 ml 


 


  


 


Intake Oral 480 ml 


 


IV Total 110 ml 


 


# Voids 5 2


 


# Bowel Movements 2 2








Laboratory Tests


11/1/20 11:57: POC Whole Blood Glucose [Pending]


11/1/20 12:40: Vancomycin Level Trough 5.9


11/1/20 17:00: POC Whole Blood Glucose [Pending]


11/1/20 21:04: POC Whole Blood Glucose [Pending]


11/2/20 05:35: POC Whole Blood Glucose [Pending]


Height (Feet):  5


Height (Inches):  5.00


Weight (Pounds):  250


Objective





Current Medications








 Medications


  (Trade)  Dose


 Ordered  Sig/Mana


 Route


 PRN Reason  Start Time


 Stop Time Status Last Admin


Dose Admin


 


 Ceftriaxone


 Sodium 1 gm/


 Dextrose  55 ml @ 


 110 mls/hr  Q24H


 IVPB


   10/31/20 12:00


 11/7/20 11:59  11/1/20 12:13





 


 Dextrose


  (Dextrose 50%)  25 ml  Q30M  PRN


 IV


 Hypoglycemia  10/31/20 00:30


 1/29/21 00:29   





 


 Dextrose


  (Dextrose 50%)  50 ml  Q30M  PRN


 IV


 Hypoglycemia  10/31/20 00:30


 1/29/21 00:29   





 


 Furosemide


  (Lasix)  20 mg  DAILY


 IV


   10/31/20 10:00


 11/30/20 09:59  11/1/20 08:37





 


 Insulin Aspart


  (NovoLOG)    BEFORE MEALS AND  HS


 SUBQ


   10/31/20 06:30


 1/29/21 06:29  11/1/20 21:12





 


 Insulin Aspart


  (NovoLOG)  6 units  NOVOTIAC


 SUBQ


   10/31/20 16:50


 1/29/21 16:49  11/1/20 17:08





 


 Insulin Detemir


  (Levemir)  18 units  DAILY


 SUBQ


   10/31/20 15:00


 1/29/21 14:59  11/1/20 08:53





 


 Lactulose


  (Cephulac)  20 gm  BID


 ORAL


   11/1/20 09:00


 12/1/20 08:59  11/1/20 17:02





 


 Morphine Sulfate


  (Morphine


 Sulfate)  2 mg  Q4H  PRN


 IVP


 For Pain  10/31/20 00:15


 11/7/20 00:14  10/31/20 21:20





 


 Pantoprazole


  (Protonix)  40 mg  EVERY 12  HOURS


 ORAL


   11/1/20 21:00


 12/1/20 20:59  11/1/20 21:01





 


 Spironolactone


  (Aldactone)  50 mg  DAILY


 ORAL


   10/31/20 10:00


 11/30/20 09:59  11/1/20 08:54





 


 Vancomycin HCl  250 ml @ 


 167.007


 mls/hr  Q8HR


 IVPB


   11/1/20 14:00


 11/6/20 13:59  11/2/20 05:28





 


 Vancomycin HCl


  (Vanco pharmacy


 to dose)  1 ea  DAILY  PRN


 MISC


 Per rx protocol  10/31/20 11:00


 11/30/20 10:59   














Assessment/Plan


Problem List:  


(1) COVID-19


ICD Codes:  U07.1 - COVID-19


SNOMED:  020410918


(2) Pneumonia due to COVID-19 virus


ICD Codes:  U07.1 - COVID-19; J12.89 - Other viral pneumonia


SNOMED:  261541722075581780


(3) Hyperglycemia


ICD Codes:  R73.9 - Hyperglycemia, unspecified


SNOMED:  47576825


Status:  unchanged


Assessment/Plan:


continue Levemir 18 units daily 


continue Novolog 6 units ac tid 


continue Novolog sliding scale ac / hs











Richardson Huber MD                  Nov 2, 2020 06:34

## 2020-11-02 NOTE — NUR
NURSE HAND-OFF: 



Important Events on Shift:none

Patient Status: stable

Diet: CCHO M



Pending Orders: paracentesis

Pending Results/Labs:AM labs

Pending MD notification:N



Latest Vital Signs: Temperature 97.9 , Pulse 90 , B/P 144 /93 , Respiratory Rate 20 , O2 SAT 
96 , Room Air, O2 Flow Rate .  

Vital Sign Comment: []



Latest Wood Fall Score: 20  

Fall Risk: Low Risk 

Safety Measures: Call light Within Reach, Bed Alarm Zone 1, Side Rails Side Rails x2, Bed 
position Low and Locked.

Fall Precautions: 

Patient Fall Education

## 2020-11-02 NOTE — GENERAL PROGRESS NOTE
Subjective


Allergies:  


Coded Allergies:  


     No Known Allergies (Unverified , 10/18/20)


Subjective


comfortable


denies SOB


no hematemesis





Objective





Last 24 Hour Vital Signs








  Date Time  Temp Pulse Resp B/P (MAP) Pulse Ox O2 Delivery O2 Flow Rate FiO2


 


11/2/20 15:48 97.5 71 18 145/72 (96) 96   


 


11/2/20 12:00 97.9 91 18 148/86 (106) 95   


 


11/2/20 08:21      Room Air  


 


11/2/20 07:56 97.7 88 20 150/97 (114) 95   


 


11/2/20 04:00 97.9 90 20 144/93 (110) 96   


 


11/2/20 00:00 98.1 81 18 144/98 (113) 96   

















Intake and Output  


 


 11/1/20 11/2/20





 19:00 07:00


 


Intake Total 590 ml 


 


Balance 590 ml 


 


  


 


Intake Oral 480 ml 


 


IV Total 110 ml 


 


# Voids 5 2


 


# Bowel Movements 2 2








Laboratory Tests


11/2/20 04:00: 


White Blood Count 9.8, Red Blood Count 4.03L, Hemoglobin 14.0L, Hematocrit 42.0,

Mean Corpuscular Volume 104H, Mean Corpuscular Hemoglobin 34.9H, Mean 

Corpuscular Hemoglobin Concent 33.5, Red Cell Distribution Width 12.9, Platelet 

Count 86L, Mean Platelet Volume 11.9H, Neutrophils (%) (Auto) , Lymphocytes (%) 

(Auto) , Monocytes (%) (Auto) , Eosinophils (%) (Auto) , Basophils (%) (Auto) , 

Differential Total Cells Counted 100, Neutrophils % (Manual) 65, Lymphocytes % 

(Manual) 27, Monocytes % (Manual) 6, Eosinophils % (Manual) 2, Basophils % 

(Manual) 0, Band Neutrophils 0, Platelet Estimate DecreasedL, Platelet 

Morphology Normal, Macrocytosis 1+, Sodium Level 141, Potassium Level 4.2, 

Chloride Level 109H, Carbon Dioxide Level 24, Anion Gap 8, Blood Urea Nitrogen 

6L, Creatinine 0.8, Estimat Glomerular Filtration Rate > 60, Glucose Level 110H,

Calcium Level 8.3L, Total Bilirubin 0.9, Aspartate Amino Transf (AST/SGOT) 101H,

Alanine Aminotransferase (ALT/SGPT) 118H, Alkaline Phosphatase 198H, Ammonia 44H

, Total Protein 6.2L, Albumin 2.4L, Globulin 3.8, Albumin/Globulin Ratio 0.6L


11/2/20 05:35: POC Whole Blood Glucose [Pending]


11/2/20 11:22: POC Whole Blood Glucose [Pending]


11/2/20 13:00: Vancomycin Level Trough 11.2


11/2/20 16:41: POC Whole Blood Glucose [Pending]


Height (Feet):  5


Height (Inches):  5.00


Weight (Pounds):  250





Assessment/Plan


Status:  progressing, unchanged


Assessment/Plan:


Assessment


- cirrhosis


- abnormal LFT - presumed due to COVID


- COVID infection





Recommendations


- follow LFT


- await hepatitis serologies


- d/c planning per PMD











Doreen Gomez MD              Nov 2, 2020 21:30

## 2020-11-02 NOTE — GENERAL PROGRESS NOTE
Subjective


ROS Limited/Unobtainable:  Yes


Allergies:  


Coded Allergies:  


     No Known Allergies (Unverified , 10/18/20)





Objective





Last 24 Hour Vital Signs








  Date Time  Temp Pulse Resp B/P (MAP) Pulse Ox O2 Delivery O2 Flow Rate FiO2


 


11/2/20 08:21      Room Air  


 


11/2/20 07:56 97.7 88 20 150/97 (114) 95   


 


11/2/20 04:00 97.9 90 20 144/93 (110) 96   


 


11/2/20 00:00 98.1 81 18 144/98 (113) 96   


 


11/1/20 20:39      Room Air  


 


11/1/20 20:00 97.5 85 18 148/101 (117) 95   


 


11/1/20 16:00 98.2 92 20 153/97 (115) 99   


 


11/1/20 12:00 98.2 97 20 147/101 (116) 96   

















Intake and Output  


 


 11/1/20 11/2/20





 19:00 07:00


 


Intake Total 590 ml 


 


Balance 590 ml 


 


  


 


Intake Oral 480 ml 


 


IV Total 110 ml 


 


# Voids 5 2


 


# Bowel Movements 2 2








Laboratory Tests


11/1/20 11:57: POC Whole Blood Glucose [Pending]


11/1/20 12:40: Vancomycin Level Trough 5.9


11/1/20 17:00: POC Whole Blood Glucose [Pending]


11/1/20 21:04: POC Whole Blood Glucose [Pending]


11/2/20 04:00: 


White Blood Count 9.8, Red Blood Count 4.03L, Hemoglobin 14.0L, Hematocrit 42.0,

 Mean Corpuscular Volume 104H, Mean Corpuscular Hemoglobin 34.9H, Mean 

Corpuscular Hemoglobin Concent 33.5, Red Cell Distribution Width 12.9, Platelet 

Count 86L, Mean Platelet Volume 11.9H, Neutrophils (%) (Auto) , Lymphocytes (%) 

(Auto) , Monocytes (%) (Auto) , Eosinophils (%) (Auto) , Basophils (%) (Auto) , 

Differential Total Cells Counted 100, Neutrophils % (Manual) 65, Lymphocytes % 

(Manual) 27, Monocytes % (Manual) 6, Eosinophils % (Manual) 2, Basophils % 

(Manual) 0, Band Neutrophils 0, Platelet Estimate DecreasedL, Platelet 

Morphology Normal, Macrocytosis 1+, Sodium Level 141, Potassium Level 4.2, 

Chloride Level 109H, Carbon Dioxide Level 24, Anion Gap 8, Blood Urea Nitrogen 

6L, Creatinine 0.8, Estimat Glomerular Filtration Rate > 60, Glucose Level 110H,

 Calcium Level 8.3L, Total Bilirubin 0.9, Aspartate Amino Transf (AST/SGOT) 101H

, Alanine Aminotransferase (ALT/SGPT) 118H, Alkaline Phosphatase 198H, Ammonia 

44H, Total Protein 6.2L, Albumin 2.4L, Globulin 3.8, Albumin/Globulin Ratio 0.6L


11/2/20 05:35: POC Whole Blood Glucose [Pending]


Height (Feet):  5


Height (Inches):  5.00


Weight (Pounds):  250





Assessment/Plan


Problem List:  


(1) Pneumonia


ICD Codes:  J18.9 - Pneumonia, unspecified organism


SNOMED:  439633741


(2) Hyperglycemia


ICD Codes:  R73.9 - Hyperglycemia, unspecified


SNOMED:  19528400


(3) LGI bleed


ICD Codes:  K92.2 - Gastrointestinal hemorrhage, unspecified


SNOMED:  92601847


(4) Ascites due to alcoholic cirrhosis


ICD Codes:  K70.31 - Alcoholic cirrhosis of liver with ascites


SNOMED:  4783053012978899


(5) Pneumonia due to COVID-19 virus


ICD Codes:  U07.1 - COVID-19; J12.89 - Other viral pneumonia


SNOMED:  444187439333301003


(6) COVID-19


ICD Codes:  U07.1 - COVID-19


SNOMED:  792167199


Status:  progressing, unchanged


Assessment/Plan:


r/o gi bleed


covid positive 


consulted dr roma barrera


h/h stable


cirrhosis


reviewed chart











Chele Gutierrez MD                   Nov 2, 2020 10:47

## 2020-11-03 VITALS — DIASTOLIC BLOOD PRESSURE: 79 MMHG | SYSTOLIC BLOOD PRESSURE: 125 MMHG

## 2020-11-03 VITALS — DIASTOLIC BLOOD PRESSURE: 81 MMHG | SYSTOLIC BLOOD PRESSURE: 122 MMHG

## 2020-11-03 VITALS — SYSTOLIC BLOOD PRESSURE: 134 MMHG | DIASTOLIC BLOOD PRESSURE: 76 MMHG

## 2020-11-03 VITALS — DIASTOLIC BLOOD PRESSURE: 72 MMHG | SYSTOLIC BLOOD PRESSURE: 127 MMHG

## 2020-11-03 VITALS — SYSTOLIC BLOOD PRESSURE: 130 MMHG | DIASTOLIC BLOOD PRESSURE: 75 MMHG

## 2020-11-03 VITALS — SYSTOLIC BLOOD PRESSURE: 135 MMHG | DIASTOLIC BLOOD PRESSURE: 76 MMHG

## 2020-11-03 RX ADMIN — INSULIN DETEMIR SCH UNITS: 100 INJECTION, SOLUTION SUBCUTANEOUS at 09:14

## 2020-11-03 RX ADMIN — INSULIN ASPART SCH UNITS: 100 INJECTION, SOLUTION INTRAVENOUS; SUBCUTANEOUS at 16:30

## 2020-11-03 RX ADMIN — INSULIN ASPART SCH UNITS: 100 INJECTION, SOLUTION INTRAVENOUS; SUBCUTANEOUS at 21:00

## 2020-11-03 RX ADMIN — LACTULOSE SCH GM: 20 SOLUTION ORAL at 17:18

## 2020-11-03 RX ADMIN — INSULIN ASPART SCH UNITS: 100 INJECTION, SOLUTION INTRAVENOUS; SUBCUTANEOUS at 17:18

## 2020-11-03 RX ADMIN — INSULIN ASPART SCH UNITS: 100 INJECTION, SOLUTION INTRAVENOUS; SUBCUTANEOUS at 05:42

## 2020-11-03 RX ADMIN — INSULIN ASPART SCH UNITS: 100 INJECTION, SOLUTION INTRAVENOUS; SUBCUTANEOUS at 05:44

## 2020-11-03 RX ADMIN — INSULIN ASPART SCH UNITS: 100 INJECTION, SOLUTION INTRAVENOUS; SUBCUTANEOUS at 12:19

## 2020-11-03 RX ADMIN — SPIRONOLACTONE SCH MG: 50 TABLET, FILM COATED ORAL at 09:10

## 2020-11-03 RX ADMIN — LACTULOSE SCH GM: 20 SOLUTION ORAL at 09:10

## 2020-11-03 RX ADMIN — SODIUM CHLORIDE SCH MLS/HR: 0.9 INJECTION INTRAVENOUS at 12:17

## 2020-11-03 NOTE — GENERAL PROGRESS NOTE
Subjective


ROS Limited/Unobtainable:  Yes


Allergies:  


Coded Allergies:  


     No Known Allergies (Unverified , 10/18/20)





Objective





Last 24 Hour Vital Signs








  Date Time  Temp Pulse Resp B/P (MAP) Pulse Ox O2 Delivery O2 Flow Rate FiO2


 


11/3/20 08:00 98.2 80 18 125/79 (94) 97   


 


11/3/20 04:00 97.6 75 20 130/75 (93) 98   


 


11/3/20 00:00 97.9 79 20 135/76 (95) 97   


 


11/3/20 00:00 98.1 79 20 132/71 (91) 99   


 


11/2/20 21:39 97.5       


 


11/2/20 21:00      Room Air  


 


11/2/20 20:00 97.9 76 20 134/76 (95) 98   


 


11/2/20 15:48 97.5 71 18 145/72 (96) 96   


 


11/2/20 12:00 97.9 91 18 148/86 (106) 95   

















Intake and Output  


 


 11/2/20 11/3/20





 19:00 07:00


 


Intake Total 850 ml 1200 ml


 


Output Total 1250 ml 


 


Balance -400 ml 1200 ml


 


  


 


Intake Oral 600 ml 1200 ml


 


IV Total 250 ml 


 


Output Estimated Blood Loss 1250 ml 


 


# Voids 2 4








Laboratory Tests


11/2/20 11:22: POC Whole Blood Glucose [Pending]


11/2/20 13:00: Vancomycin Level Trough 11.2


11/2/20 16:41: POC Whole Blood Glucose [Pending]


11/3/20 09:13: POC Whole Blood Glucose 165H


Height (Feet):  5


Height (Inches):  5.00


Weight (Pounds):  250





Assessment/Plan


Problem List:  


(1) Pneumonia


ICD Codes:  J18.9 - Pneumonia, unspecified organism


SNOMED:  141774335


(2) Hyperglycemia


ICD Codes:  R73.9 - Hyperglycemia, unspecified


SNOMED:  40637931


(3) LGI bleed


ICD Codes:  K92.2 - Gastrointestinal hemorrhage, unspecified


SNOMED:  81450949


(4) Ascites due to alcoholic cirrhosis


ICD Codes:  K70.31 - Alcoholic cirrhosis of liver with ascites


SNOMED:  5690105020788944


(5) Pneumonia due to COVID-19 virus


ICD Codes:  U07.1 - COVID-19; J12.89 - Other viral pneumonia


SNOMED:  011125053307058582


(6) COVID-19


ICD Codes:  U07.1 - COVID-19


SNOMED:  057644228


Status:  progressing, unchanged


Assessment/Plan:


prn oxygen


afebrile


covid positive 


h/h stable


cirrhosis


no sob











Chele Gutierrez MD                   Nov 3, 2020 10:18

## 2020-11-03 NOTE — NUR
NURSE NOTES:



Received report from ASHWIN Urbina. Pt a/o x 4, in bed, watching phone. No discomfort noted. Pt 
said "Everything is fine. No pain". Rt AC IV line is patent. Bed in lowest position, call 
light within reach. Will continue to monitor.

## 2020-11-03 NOTE — NUR
NURSE NOTES:

Received report from sandie perez, reecern. patient on bed, awake. on room air. no sob. with 
left forearm iv access, saline lock. per najin,rn" patient is not ready for discharge 
secondary to covid 19 +". afebrile. ambulates.  reiterated to call and ask for assistance to 
prevent fall or injury. call light and light button within easy reach. bed locked and in 
lowest position. will continue plan of care.

## 2020-11-03 NOTE — HEMATOLOGY/ONC PROGRESS NOTE
Assessment/Plan


Assessment/Plan





Assessment and Recs


# Thrombocytopenia - potential causes multifactorial, does endorse a hx of 

alcoholic cirrhosis


--> hiv and hep thus far neg


--> CT 2.  Cirrhosis of the liver. 3.  Varices at the gastrohepatic ligament 

region consistent with  underlying portal venous hypertension.


--> Peripheral smear ordered to evaluate for blasts /schistocytes 


--> abx and other meds have been reviewed 


--> ok for ppx if plt >50k w/ either heparin or lovenox 


--> Transfuse if Plt < 20k and fever, or if Plt < 10k without fever 


-> pl;t 83-->91-->87


# Anemia that is due to LGI bleed


--> r'o variceal bleed


--> per gi endscopy prn


--> ppi as needed


--> hgb 14


# Leukocytosis is due to Bilateral pna with COVID-19


--> ABX vanc/ctx


--> smear is reviewed


# Hyperglycemia


-> iss and accuchecks as needed


# Ascites due to alcoholic cirrhosis


# Dvt ppx scds





The timing of this note does not necessarily reflect the time of the patient was

seen.





Greatly appreciate consultation.





Subjective


Constitutional:  Denies: no symptoms, chills, fever, malaise, weakness, other


HEENT:  Denies: no symptoms, eye pain, blurred vision, tearing, double vision, 

ear pain, ear discharge, nose pain, nose congestion, throat pain, throat 

swelling, mouth pain, mouth swelling, other


Cardiovascular:  Denies: no symptoms, chest pain, edema, irregular heart rate, 

lightheadedness, palpitations, syncope, other


Respiratory:  Denies: no symptoms, cough, shortness of breath, SOB with 

excertion, SOB at rest, sputum, wheezing, other


Genitourinary:  Denies: no symptoms, burning, discharge, frequency, flank pain, 

hematuria, incontinence, pain, urgency, other


Neurologic/Psychiatric:  Denies: no symptoms, anxiety, depressed, emotional 

problems, headache, numbness, paresthesia, pre-existing deficit, seizure, 

tingling, tremors, weakness, other


Endocrine:  Denies: no symptoms, excessive sweating, flushing, intolerance to 

cold, intolerance to heat, increased hunger, increased thirst, increased urine, 

unexplained weight gain, unexplained weight loss, other


Hematologic/Lymphatic:  Denies: no symptoms, anemia, easy bleeding, easy 

bruising, adenopathy, other


Allergies:  


Coded Allergies:  


     No Known Allergies (Unverified , 10/18/20)


Subjective


11/2 for paracentesis to be done, no bleeding, no night sweats


11/3 labs reviewed, meds noted, no night sweats, plt lower





Objective


Objective





Current Medications








 Medications


  (Trade)  Dose


 Ordered  Sig/Mana


 Route


 PRN Reason  Start Time


 Stop Time Status Last Admin


Dose Admin


 


 Ceftriaxone


 Sodium 1 gm/


 Dextrose  55 ml @ 


 110 mls/hr  Q24H


 IVPB


   10/31/20 12:00


 11/7/20 11:59  11/2/20 11:24





 


 Dextrose


  (Dextrose 50%)  25 ml  Q30M  PRN


 IV


 Hypoglycemia  10/31/20 00:30


 1/29/21 00:29   





 


 Dextrose


  (Dextrose 50%)  50 ml  Q30M  PRN


 IV


 Hypoglycemia  10/31/20 00:30


 1/29/21 00:29   





 


 Furosemide


  (Lasix)  20 mg  DAILY


 IV


   10/31/20 10:00


 11/30/20 09:59  11/2/20 08:27





 


 Insulin Aspart


  (NovoLOG)    BEFORE MEALS AND  HS


 SUBQ


   10/31/20 06:30


 1/29/21 06:29  11/3/20 05:42





 


 Insulin Aspart


  (NovoLOG)  6 units  NOVOTIAC


 SUBQ


   10/31/20 16:50


 1/29/21 16:49  11/3/20 05:44





 


 Insulin Detemir


  (Levemir)  18 units  DAILY


 SUBQ


   10/31/20 15:00


 1/29/21 14:59  11/2/20 08:29





 


 Lactulose


  (Cephulac)  20 gm  BID


 ORAL


   11/1/20 09:00


 12/1/20 08:59  11/2/20 17:01





 


 Morphine Sulfate


  (Morphine


 Sulfate)  2 mg  Q4H  PRN


 IVP


 For Pain  10/31/20 00:15


 11/7/20 00:14  11/2/20 21:09





 


 Pantoprazole


  (Protonix)  40 mg  EVERY 12  HOURS


 ORAL


   11/1/20 21:00


 12/1/20 20:59  11/2/20 20:57





 


 Spironolactone


  (Aldactone)  50 mg  DAILY


 ORAL


   10/31/20 10:00


 11/30/20 09:59  11/2/20 08:28





 


 Vancomycin HCl  250 ml @ 


 167.007


 mls/hr  Q8HR


 IVPB


   11/1/20 14:00


 11/6/20 13:59  11/3/20 05:41





 


 Vancomycin HCl


  (Vanco pharmacy


 to dose)  1 ea  DAILY  PRN


 MISC


 Per rx protocol  10/31/20 11:00


 11/30/20 10:59   














Last 24 Hour Vital Signs








  Date Time  Temp Pulse Resp B/P (MAP) Pulse Ox O2 Delivery O2 Flow Rate FiO2


 


11/3/20 04:00 97.6 75 20 130/75 (93) 98   


 


11/3/20 00:00 97.9 79 20 135/76 (95) 97   


 


11/3/20 00:00 98.1 79 20 132/71 (91) 99   


 


11/2/20 21:39 97.5       


 


11/2/20 21:00      Room Air  


 


11/2/20 20:00 97.9 76 20 134/76 (95) 98   


 


11/2/20 15:48 97.5 71 18 145/72 (96) 96   


 


11/2/20 12:00 97.9 91 18 148/86 (106) 95   


 


11/2/20 08:21      Room Air  


 


11/2/20 07:56 97.7 88 20 150/97 (114) 95   


 


11/2/20 04:00 97.9 90 20 144/93 (110) 96   


 


11/2/20 00:00 98.1 81 18 144/98 (113) 96   


 


11/1/20 20:39      Room Air  


 


11/1/20 20:00 97.5 85 18 148/101 (117) 95   


 


11/1/20 16:00 98.2 92 20 153/97 (115) 99   


 


11/1/20 12:00 98.2 97 20 147/101 (116) 96   


 


11/1/20 09:00      Room Air  


 


11/1/20 08:00 98.1 102 21 143/99 (114) 97   

















Intake and Output  


 


 11/2/20 11/3/20





 19:00 07:00


 


Intake Total 850 ml 1200 ml


 


Output Total 1250 ml 


 


Balance -400 ml 1200 ml


 


  


 


Intake Oral 600 ml 1200 ml


 


IV Total 250 ml 


 


Output Estimated Blood Loss 1250 ml 


 


# Voids 2 4











Labs








Test


 10/31/20


11:22 10/31/20


16:00 10/31/20


20:43 11/1/20


04:00


 


POC Whole Blood Glucose


 313 MG/DL


() 293 MG/DL


() 257 MG/DL


() 





 


White Blood Count


 


 


 


 11.4 K/UL


(4.8-10.8)


 


Red Blood Count


 


 


 


 3.76 M/UL


(4.70-6.10)


 


Hemoglobin


 


 


 


 13.2 G/DL


(14.2-18.0)


 


Hematocrit


 


 


 


 38.8 %


(42.0-52.0)


 


Mean Corpuscular Volume    103 FL (80-99) 


 


Mean Corpuscular Hemoglobin


 


 


 


 35.2 PG


(27.0-31.0)


 


Mean Corpuscular Hemoglobin


Concent 


 


 


 34.1 G/DL


(32.0-36.0)


 


Red Cell Distribution Width


 


 


 


 12.9 %


(11.6-14.8)


 


Platelet Count


 


 


 


 91 K/UL


(150-450)


 


Mean Platelet Volume


 


 


 


 11.5 FL


(6.5-10.1)


 


Neutrophils (%) (Auto)     % (45.0-75.0) 


 


Lymphocytes (%) (Auto)     % (20.0-45.0) 


 


Monocytes (%) (Auto)     % (1.0-10.0) 


 


Eosinophils (%) (Auto)     % (0.0-3.0) 


 


Basophils (%) (Auto)     % (0.0-2.0) 


 


Differential Total Cells


Counted 


 


 


 100 





 


Neutrophils % (Manual)    73 % (45-75) 


 


Lymphocytes % (Manual)    15 % (20-45) 


 


Monocytes % (Manual)    10 % (1-10) 


 


Eosinophils % (Manual)    2 % (0-3) 


 


Basophils % (Manual)    0 % (0-2) 


 


Band Neutrophils    0 % (0-8) 


 


Platelet Estimate    Decreased 


 


Platelet Morphology    Normal 


 


Hypochromasia    1+ 


 


Macrocytosis    1+ 


 


Sodium Level


 


 


 


 138 MMOL/L


(136-145)


 


Potassium Level


 


 


 


 3.5 MMOL/L


(3.5-5.1)


 


Chloride Level


 


 


 


 105 MMOL/L


()


 


Carbon Dioxide Level


 


 


 


 25 MMOL/L


(21-32)


 


Anion Gap


 


 


 


 8 mmol/L


(5-15)


 


Blood Urea Nitrogen    7 mg/dL (7-18) 


 


Creatinine


 


 


 


 0.6 MG/DL


(0.55-1.30)


 


Estimat Glomerular Filtration


Rate 


 


 


 > 60 mL/min


(>60)


 


Glucose Level


 


 


 


 139 MG/DL


()


 


Calcium Level


 


 


 


 7.9 MG/DL


(8.5-10.1)


 


Total Bilirubin


 


 


 


 0.9 MG/DL


(0.2-1.0)


 


Aspartate Amino Transf


(AST/SGOT) 


 


 


 79 U/L (15-37) 





 


Alanine Aminotransferase


(ALT/SGPT) 


 


 


 105 U/L


(12-78)


 


Alkaline Phosphatase


 


 


 


 199 U/L


()


 


Ammonia


 


 


 


 60 umol/L


(11-32)


 


Total Protein


 


 


 


 5.7 G/DL


(6.4-8.2)


 


Albumin


 


 


 


 2.3 G/DL


(3.4-5.0)


 


Globulin    3.4 g/dL 


 


Albumin/Globulin Ratio    0.7 (1.0-2.7) 


 


Hepatitis A IgM Antibody


 


 


 


 Negative


(Negative)


 


Hepatitis B Surface Antigen


 


 


 


 Negative


(Negative)


 


Hepatitis B Core IgM Antibody


 


 


 


 Negative


(Negative)


 


Hepatitis C Antibody


 


 


 


 <0.1 s/co


ratio


 


HIV (1&2) Antibody Rapid


 


 


 


 Negative


(NEGATIVE)


 


Test


 11/1/20


11:57 11/1/20


12:40 11/1/20


17:00 11/1/20


21:04


 


Vancomycin Level Trough


 


 5.9 ug/mL


(5.0-12.0) 


 





 


Test


 11/2/20


04:00 11/2/20


05:35 11/2/20


11:22 11/2/20


13:00


 


White Blood Count


 9.8 K/UL


(4.8-10.8) 


 


 





 


Red Blood Count


 4.03 M/UL


(4.70-6.10) 


 


 





 


Hemoglobin


 14.0 G/DL


(14.2-18.0) 


 


 





 


Hematocrit


 42.0 %


(42.0-52.0) 


 


 





 


Mean Corpuscular Volume 104 FL (80-99)    


 


Mean Corpuscular Hemoglobin


 34.9 PG


(27.0-31.0) 


 


 





 


Mean Corpuscular Hemoglobin


Concent 33.5 G/DL


(32.0-36.0) 


 


 





 


Red Cell Distribution Width


 12.9 %


(11.6-14.8) 


 


 





 


Platelet Count


 86 K/UL


(150-450) 


 


 





 


Mean Platelet Volume


 11.9 FL


(6.5-10.1) 


 


 





 


Neutrophils (%) (Auto)  % (45.0-75.0)    


 


Lymphocytes (%) (Auto)  % (20.0-45.0)    


 


Monocytes (%) (Auto)  % (1.0-10.0)    


 


Eosinophils (%) (Auto)  % (0.0-3.0)    


 


Basophils (%) (Auto)  % (0.0-2.0)    


 


Differential Total Cells


Counted 100 


 


 


 





 


Neutrophils % (Manual) 65 % (45-75)    


 


Lymphocytes % (Manual) 27 % (20-45)    


 


Monocytes % (Manual) 6 % (1-10)    


 


Eosinophils % (Manual) 2 % (0-3)    


 


Basophils % (Manual) 0 % (0-2)    


 


Band Neutrophils 0 % (0-8)    


 


Platelet Estimate Decreased    


 


Platelet Morphology Normal    


 


Macrocytosis 1+    


 


Sodium Level


 141 MMOL/L


(136-145) 


 


 





 


Potassium Level


 4.2 MMOL/L


(3.5-5.1) 


 


 





 


Chloride Level


 109 MMOL/L


() 


 


 





 


Carbon Dioxide Level


 24 MMOL/L


(21-32) 


 


 





 


Anion Gap


 8 mmol/L


(5-15) 


 


 





 


Blood Urea Nitrogen 6 mg/dL (7-18)    


 


Creatinine


 0.8 MG/DL


(0.55-1.30) 


 


 





 


Estimat Glomerular Filtration


Rate > 60 mL/min


(>60) 


 


 





 


Glucose Level


 110 MG/DL


() 


 


 





 


Calcium Level


 8.3 MG/DL


(8.5-10.1) 


 


 





 


Total Bilirubin


 0.9 MG/DL


(0.2-1.0) 


 


 





 


Aspartate Amino Transf


(AST/SGOT) 101 U/L


(15-37) 


 


 





 


Alanine Aminotransferase


(ALT/SGPT) 118 U/L


(12-78) 


 


 





 


Alkaline Phosphatase


 198 U/L


() 


 


 





 


Ammonia


 44 umol/L


(11-32) 


 


 





 


Total Protein


 6.2 G/DL


(6.4-8.2) 


 


 





 


Albumin


 2.4 G/DL


(3.4-5.0) 


 


 





 


Globulin 3.8 g/dL    


 


Albumin/Globulin Ratio 0.6 (1.0-2.7)    


 


Vancomycin Level Trough


 


 


 


 11.2 ug/mL


(5.0-12.0)


 


Test


 11/2/20


16:41 


 


 











Height (Feet):  5


Height (Inches):  5.00


Weight (Pounds):  250


Objective





Physical Exam


Sp02 EP Interpretation:  reviewed, normal


General:  no apparent distress


Head:  normocephalic, atraumatic


Eyes:  bilateral eye normal inspection, bilateral eye PERRL


ENT:  hearing grossly normal, normal pharynx, no angioedema, normal voice


Neck:  full range of motion, supple/symm/no masses


Respiratory:  chest non-tender, lungs clear, normal breath sounds, speaking full

sentences


Cardiovascular:  regular rate, rhythm, no edema


Gastrointestinal:  normal bowel sounds, ++ distended


Genitourinary:  normal inspection, no CVA tenderness


Neurologic:  alert, motor strength/tone normal, oriented x3, sensory intact, 

responsive, speech normal


Skin:  no rash


Lymphatic:  no adenopathy











Gary Mclean MD           Nov 3, 2020 06:54

## 2020-11-03 NOTE — NUR
NURSE HAND-OFF: 



Important Events on Shift: n/a

Patient Status: stable

Diet: CCHO Medium



Pending Orders: 

Pending Results/Labs:

Pending MD notification:



Latest Vital Signs: Temperature 99.3 , Pulse 76 , B/P 122 /81 , Respiratory Rate 18 , O2 SAT 
98 , Room Air, O2 Flow Rate .  

Vital Sign Comment: 



Latest Wood Fall Score: 35  

Fall Risk: Medium Risk 

Safety Measures: Call light Within Reach, Bed Alarm Zone 1, Side Rails Side Rails x2, Bed 
position Low and Locked.

Fall Precautions: 

Patient Fall Education



Report given to ASHWIN Urbina. Endorsed plan of care.

## 2020-11-03 NOTE — GENERAL PROGRESS NOTE
Subjective


Allergies:  


Coded Allergies:  


     No Known Allergies (Unverified , 10/18/20)


Subjective


d/w RN


patient is comfortable


no N/V


no melena


s/p paracentesis





Objective





Last 24 Hour Vital Signs








  Date Time  Temp Pulse Resp B/P (MAP) Pulse Ox O2 Delivery O2 Flow Rate FiO2


 


11/3/20 16:00 99.3 76 18 122/81 (95) 98   


 


11/3/20 12:00 99.3 80 18 127/72 (90) 98   


 


11/3/20 09:00      Room Air  


 


11/3/20 08:00 98.2 80 18 125/79 (94) 97   


 


11/3/20 04:00 97.6 75 20 130/75 (93) 98   


 


11/3/20 00:00 97.9 79 20 135/76 (95) 97   


 


11/3/20 00:00 98.1 79 20 132/71 (91) 99   


 


11/2/20 21:39 97.5       


 


11/2/20 21:00      Room Air  


 


11/2/20 20:00 97.9 76 20 134/76 (95) 98   

















Intake and Output  


 


 11/2/20 11/3/20





 19:00 07:00


 


Intake Total 850 ml 1200 ml


 


Output Total 1250 ml 


 


Balance -400 ml 1200 ml


 


  


 


Intake Oral 600 ml 1200 ml


 


IV Total 250 ml 


 


Output Estimated Blood Loss 1250 ml 


 


# Voids 2 4








Laboratory Tests


11/3/20 09:13: POC Whole Blood Glucose 165H


11/3/20 12:16: POC Whole Blood Glucose 201H


11/3/20 17:14: POC Whole Blood Glucose [Pending]


11/3/20 17:27: POC Whole Blood Glucose 92


Height (Feet):  5


Height (Inches):  5.00


Weight (Pounds):  250


Objective


Exam limited due to COVID isolation





Assessment/Plan


Status:  progressing, unchanged


Assessment/Plan:


Assessment


- cirrhosis - hepatitis B/c negative


- abnormal LFT - presumed due to COVID


- COVID infection


- s/p paracentesis





Recommendations


- follow LFT


- diuretics


- lactulose


- d/c planning per PMD











Doreen Gomez MD              Nov 3, 2020 19:15

## 2020-11-03 NOTE — GENERAL PROGRESS NOTE
Subjective


Allergies:  


Coded Allergies:  


     No Known Allergies (Unverified , 10/18/20)


All Systems:  reviewed and negative except above


Subjective


events noted 


interval notes reviewed 


glucose values are stable 











Item Value  Date Time


 


Bedside Blood Glucose 145 mg/dl H 11/3/20 0626


 


Bedside Blood Glucose 173 mg/dl H 11/2/20 2100


 


Bedside Blood Glucose 182 mg/dl H 11/2/20 1645


 


Bedside Blood Glucose 233 mg/dl H 11/2/20 1310


 


Bedside Blood Glucose 112 mg/dl 11/2/20 0829











Objective





Last 24 Hour Vital Signs








  Date Time  Temp Pulse Resp B/P (MAP) Pulse Ox O2 Delivery O2 Flow Rate FiO2


 


11/3/20 04:00 97.6 75 20 130/75 (93) 98   


 


11/3/20 00:00 97.9 79 20 135/76 (95) 97   


 


11/3/20 00:00 98.1 79 20 132/71 (91) 99   


 


11/2/20 21:39 97.5       


 


11/2/20 21:00      Room Air  


 


11/2/20 20:00 97.9 76 20 134/76 (95) 98   


 


11/2/20 15:48 97.5 71 18 145/72 (96) 96   


 


11/2/20 12:00 97.9 91 18 148/86 (106) 95   


 


11/2/20 08:21      Room Air  


 


11/2/20 07:56 97.7 88 20 150/97 (114) 95   

















Intake and Output  


 


 11/2/20 11/3/20





 19:00 07:00


 


Intake Total 850 ml 1200 ml


 


Output Total 1250 ml 


 


Balance -400 ml 1200 ml


 


  


 


Intake Oral 600 ml 1200 ml


 


IV Total 250 ml 


 


Output Estimated Blood Loss 1250 ml 


 


# Voids 2 4








Laboratory Tests


11/2/20 11:22: POC Whole Blood Glucose [Pending]


11/2/20 13:00: Vancomycin Level Trough 11.2


11/2/20 16:41: POC Whole Blood Glucose [Pending]


Height (Feet):  5


Height (Inches):  5.00


Weight (Pounds):  250


Objective





Current Medications








 Medications


  (Trade)  Dose


 Ordered  Sig/Mana


 Route


 PRN Reason  Start Time


 Stop Time Status Last Admin


Dose Admin


 


 Ceftriaxone


 Sodium 1 gm/


 Dextrose  55 ml @ 


 110 mls/hr  Q24H


 IVPB


   10/31/20 12:00


 11/7/20 11:59  11/2/20 11:24





 


 Dextrose


  (Dextrose 50%)  25 ml  Q30M  PRN


 IV


 Hypoglycemia  10/31/20 00:30


 1/29/21 00:29   





 


 Dextrose


  (Dextrose 50%)  50 ml  Q30M  PRN


 IV


 Hypoglycemia  10/31/20 00:30


 1/29/21 00:29   





 


 Furosemide


  (Lasix)  20 mg  DAILY


 IV


   10/31/20 10:00


 11/30/20 09:59  11/2/20 08:27





 


 Insulin Aspart


  (NovoLOG)    BEFORE MEALS AND  HS


 SUBQ


   10/31/20 06:30


 1/29/21 06:29  11/3/20 05:42





 


 Insulin Aspart


  (NovoLOG)  6 units  NOVOTIAC


 SUBQ


   10/31/20 16:50


 1/29/21 16:49  11/3/20 05:44





 


 Insulin Detemir


  (Levemir)  18 units  DAILY


 SUBQ


   10/31/20 15:00


 1/29/21 14:59  11/2/20 08:29





 


 Lactulose


  (Cephulac)  20 gm  BID


 ORAL


   11/1/20 09:00


 12/1/20 08:59  11/2/20 17:01





 


 Morphine Sulfate


  (Morphine


 Sulfate)  2 mg  Q4H  PRN


 IVP


 For Pain  10/31/20 00:15


 11/7/20 00:14  11/2/20 21:09





 


 Pantoprazole


  (Protonix)  40 mg  EVERY 12  HOURS


 ORAL


   11/1/20 21:00


 12/1/20 20:59  11/2/20 20:57





 


 Spironolactone


  (Aldactone)  50 mg  DAILY


 ORAL


   10/31/20 10:00


 11/30/20 09:59  11/2/20 08:28





 


 Vancomycin HCl  250 ml @ 


 167.007


 mls/hr  Q8HR


 IVPB


   11/1/20 14:00


 11/6/20 13:59  11/3/20 05:41





 


 Vancomycin HCl


  ([facility]


 to dose)  1 ea  DAILY  PRN


 MISC


 Per rx protocol  10/31/20 11:00


 11/30/20 10:59   














Assessment/Plan


Problem List:  


(1) COVID-19


ICD Codes:  U07.1 - COVID-19


SNOMED:  614599681


(2) Pneumonia due to COVID-19 virus


ICD Codes:  U07.1 - COVID-19; J12.89 - Other viral pneumonia


SNOMED:  262885385375877848


(3) Hyperglycemia


ICD Codes:  R73.9 - Hyperglycemia, unspecified


SNOMED:  71176636


Status:  progressing, unchanged


Assessment/Plan:


continue Levemir 18 units daily 


continue Novolog 6 units ac tid 


continue Novolog sliding scale ac / hs











Richardson Huber MD                  Nov 3, 2020 06:28

## 2020-11-03 NOTE — INFECTIOUS DISEASES PROG NOTE
Assessment/Plan





Assessment/Plan:


34 yo male with with PMHx of DM and COVID 19 Dx 10/19/20 who presented to the ED

on 10/30/20 with blood in his stool and abdominal pain.





Leukocytosis- SP


Ascities- r/o SBP


    -11/2 sp paracentesis: yielding 1.25 liters of fluid


  -CT abd/p:   There is moderate abdominal and pelvic ascites. Cirrhosis of the 

liver. Varices at the gastrohepatic ligament region consistent with underlying 

portal venous hypertension.  No other acute findings.


 





No fever


Abdominal Pain


COVID 19


     Stable on RA


     SARS COV 2 PCR Pos 10/18/20 and 10/30/20


     CXR  10/30/20  Improved B/L Infiltrates





Elevated LFts; increased


  -HIV ab screen neg, Acute hep panel p





DM


Cirrhosis  seen on CT





PLAN





- Cont Ceftriaxone #4 pending ascites fluid


-Dc empiric IV Vancomycin #4


- f/u Cx





- COVID 19 Iso for now- not candidate for Remdesivir according to [facility] [place]

force internal guidelines- need to be on supplemental O2





- Monitor Respiratory status





- f/u GI recs for GI Bleed





Thank you for this consult. [facility] will continue to follow Mr. Hilliard 

with you.





Subjective


Allergies:  


Coded Allergies:  


     No Known Allergies (Unverified , 10/18/20)


afebrile


at RA





Objective





Last 24 Hour Vital Signs








  Date Time  Temp Pulse Resp B/P (MAP) Pulse Ox O2 Delivery O2 Flow Rate FiO2


 


11/3/20 12:00 99.3 80 18 127/72 (90) 98   


 


11/3/20 09:00      Room Air  


 


11/3/20 08:00 98.2 80 18 125/79 (94) 97   


 


11/3/20 04:00 97.6 75 20 130/75 (93) 98   


 


11/3/20 00:00 97.9 79 20 135/76 (95) 97   


 


11/3/20 00:00 98.1 79 20 132/71 (91) 99   


 


11/2/20 21:39 97.5       


 


11/2/20 21:00      Room Air  


 


11/2/20 20:00 97.9 76 20 134/76 (95) 98   


 


11/2/20 15:48 97.5 71 18 145/72 (96) 96   








Height (Feet):  5


Height (Inches):  5.00


Weight (Pounds):  250


GEN: NAD on RA


HEENT: NCAT, MMM, EOMI, No Scleral icterus, 


Neck: No LAD, Supple


LUNGS: CTAB, No W


HEART: RRR, S1, S2. 


ABD: Soft, NT, Mild Distension


Skin: No rash,, Normal in color


NEURO: A/O x 4, No focal deficits





Laboratory Tests








Test


 11/2/20


16:41 11/3/20


09:13 11/3/20


12:16


 


POC Whole Blood Glucose


 Pending  


 165 MG/DL


()  H 201 MG/DL


()  H











Current Medications








 Medications


  (Trade)  Dose


 Ordered  Sig/Mana


 Route


 PRN Reason  Start Time


 Stop Time Status Last Admin


Dose Admin


 


 Ceftriaxone


 Sodium 1 gm/


 Dextrose  55 ml @ 


 110 mls/hr  Q24H


 IVPB


   10/31/20 12:00


 11/7/20 11:59  11/3/20 12:17





 


 Dextrose


  (Dextrose 50%)  25 ml  Q30M  PRN


 IV


 Hypoglycemia  10/31/20 00:30


 1/29/21 00:29   





 


 Dextrose


  (Dextrose 50%)  50 ml  Q30M  PRN


 IV


 Hypoglycemia  10/31/20 00:30


 1/29/21 00:29   





 


 Furosemide


  (Lasix)  20 mg  DAILY


 IV


   10/31/20 10:00


 11/30/20 09:59  11/3/20 09:10





 


 Insulin Aspart


  (NovoLOG)    BEFORE MEALS AND  HS


 SUBQ


   10/31/20 06:30


 1/29/21 06:29  11/3/20 12:19





 


 Insulin Aspart


  (NovoLOG)  6 units  NOVOTIAC


 SUBQ


   10/31/20 16:50


 1/29/21 16:49  11/3/20 12:19





 


 Insulin Detemir


  (Levemir)  18 units  DAILY


 SUBQ


   10/31/20 15:00


 1/29/21 14:59  11/3/20 09:14





 


 Lactulose


  (Cephulac)  20 gm  BID


 ORAL


   11/1/20 09:00


 12/1/20 08:59  11/3/20 09:10





 


 Morphine Sulfate


  (Morphine


 Sulfate)  2 mg  Q4H  PRN


 IVP


 For Pain  10/31/20 00:15


 11/7/20 00:14  11/2/20 21:09





 


 Pantoprazole


  (Protonix)  40 mg  EVERY 12  HOURS


 ORAL


   11/1/20 21:00


 12/1/20 20:59  11/3/20 09:10





 


 Spironolactone


  (Aldactone)  50 mg  DAILY


 ORAL


   10/31/20 10:00


 11/30/20 09:59  11/3/20 09:10





 


 Vancomycin HCl  250 ml @ 


 167.007


 mls/hr  Q8HR


 IVPB


   11/1/20 14:00


 11/6/20 13:59  11/3/20 13:28





 


 Vancomycin HCl


  ([facility]


 to dose)  1 ea  DAILY  PRN


 MISC


 Per rx protocol  10/31/20 11:00


 11/30/20 10:59   




















Sun Mishra M.D.             Nov 3, 2020 13:38

## 2020-11-03 NOTE — NUR
NURSE HAND-OFF: 



Important Events on Shift: PAIN MNGT, AFEBRILE,SAFETY, GLUCOSE MONITORING

Patient Status: STABLE

Diet: CCHO 



Pending Orders: 

Pending Results/Labs:

Pending MD notification:



Latest Vital Signs: Temperature 97.6 , Pulse 75 , B/P 130 /75 , Respiratory Rate 20 , O2 SAT 
98 , Room Air, O2 Flow Rate .  

Vital Sign Comment: 



Latest Wood Fall Score: 20  

Fall Risk: Low Risk 

Safety Measures: Call light Within Reach, Bed Alarm Zone 1, Side Rails Side Rails x2, Bed 
position Low and Locked.

Fall Precautions: 

Patient Fall Education

## 2020-11-03 NOTE — NUR
NURSE NOTES:

Rn followed up with Dr. Gutierrez if he is planning to discharge patient. Per Dr. Gutierrez,patient is covid (+) so he is not ready for discharge yet.

## 2020-11-04 VITALS — DIASTOLIC BLOOD PRESSURE: 62 MMHG | SYSTOLIC BLOOD PRESSURE: 127 MMHG

## 2020-11-04 VITALS — DIASTOLIC BLOOD PRESSURE: 79 MMHG | SYSTOLIC BLOOD PRESSURE: 129 MMHG

## 2020-11-04 VITALS — SYSTOLIC BLOOD PRESSURE: 130 MMHG | DIASTOLIC BLOOD PRESSURE: 81 MMHG

## 2020-11-04 VITALS — DIASTOLIC BLOOD PRESSURE: 68 MMHG | SYSTOLIC BLOOD PRESSURE: 116 MMHG

## 2020-11-04 VITALS — SYSTOLIC BLOOD PRESSURE: 130 MMHG | DIASTOLIC BLOOD PRESSURE: 72 MMHG

## 2020-11-04 RX ADMIN — INSULIN ASPART SCH UNITS: 100 INJECTION, SOLUTION INTRAVENOUS; SUBCUTANEOUS at 05:46

## 2020-11-04 RX ADMIN — LACTULOSE SCH GM: 20 SOLUTION ORAL at 08:25

## 2020-11-04 RX ADMIN — SPIRONOLACTONE SCH MG: 50 TABLET, FILM COATED ORAL at 08:25

## 2020-11-04 RX ADMIN — INSULIN ASPART SCH UNITS: 100 INJECTION, SOLUTION INTRAVENOUS; SUBCUTANEOUS at 11:58

## 2020-11-04 RX ADMIN — INSULIN ASPART SCH UNITS: 100 INJECTION, SOLUTION INTRAVENOUS; SUBCUTANEOUS at 11:57

## 2020-11-04 RX ADMIN — SODIUM CHLORIDE SCH MLS/HR: 0.9 INJECTION INTRAVENOUS at 11:47

## 2020-11-04 RX ADMIN — INSULIN ASPART SCH UNITS: 100 INJECTION, SOLUTION INTRAVENOUS; SUBCUTANEOUS at 05:44

## 2020-11-04 RX ADMIN — INSULIN DETEMIR SCH UNITS: 100 INJECTION, SOLUTION SUBCUTANEOUS at 08:29

## 2020-11-04 NOTE — NUR
NURSE NOTES:

 Patient discharge with discharge instructions given.Patient has his personal belongings 
patient aware to followup with his primary Doctor regarding Monitoring Blood sugars and 
followup regarding medications. Patient aware  to wear mask and isolate at home as 
needed..IV removed and ID   hospital band removed.Patient accompany down to private  
vehicle.

## 2020-11-04 NOTE — NUR
DISCHARGE PLANNING



PATIENT HAS BEEN REFERRED TO 



[facility]

P [phone]

F [phone]

## 2020-11-04 NOTE — GENERAL PROGRESS NOTE
Subjective


Allergies:  


Coded Allergies:  


     No Known Allergies (Unverified , 10/18/20)


All Systems:  reviewed and negative except above


Subjective


events noted 


interval notes reviewed 


glucose values are stable 











Item Value  Date Time


 


Bedside Blood Glucose 126 mg/dl H 11/4/20 0608


 


Bedside Blood Glucose 125 mg/dl H 11/3/20 2100


 


Bedside Blood Glucose 108 mg/dl 11/3/20 1718


 


Bedside Blood Glucose 201 mg/dl H 11/3/20 1219


 


Bedside Blood Glucose 165 mg/dl H 11/3/20 0914


 


Bedside Blood Glucose 145 mg/dl H 11/3/20 0626











Objective





Last 24 Hour Vital Signs








  Date Time  Temp Pulse Resp B/P (MAP) Pulse Ox O2 Delivery O2 Flow Rate FiO2


 


11/4/20 04:00 97.5 78 20 130/72 (91) 97   


 


11/4/20 00:00 97.9 80 20 129/79 (96) 98   


 


11/3/20 21:00      Room Air  


 


11/3/20 20:00 97.6 79 20 134/76 (95) 99   


 


11/3/20 16:00 99.3 76 18 122/81 (95) 98   


 


11/3/20 12:00 99.3 80 18 127/72 (90) 98   


 


11/3/20 09:00      Room Air  


 


11/3/20 08:00 98.2 80 18 125/79 (94) 97   

















Intake and Output  


 


 11/3/20 11/4/20





 19:00 07:00


 


Intake Total 1255 ml 900 ml


 


Balance 1255 ml 900 ml


 


  


 


Intake Oral 1200 ml 900 ml


 


IV Total 55 ml 


 


# Voids 5 3


 


# Bowel Movements 1 








Laboratory Tests


11/3/20 09:13: POC Whole Blood Glucose 165H


11/3/20 12:16: POC Whole Blood Glucose 201H


11/3/20 17:14: POC Whole Blood Glucose [Pending]


11/3/20 17:27: POC Whole Blood Glucose 92


11/3/20 20:04: POC Whole Blood Glucose 124H


Height (Feet):  5


Height (Inches):  5.00


Weight (Pounds):  250


Objective





Current Medications








 Medications


  (Trade)  Dose


 Ordered  Sig/Mana


 Route


 PRN Reason  Start Time


 Stop Time Status Last Admin


Dose Admin


 


 Ceftriaxone


 Sodium 1 gm/


 Dextrose  55 ml @ 


 110 mls/hr  Q24H


 IVPB


   10/31/20 12:00


 11/7/20 11:59  11/3/20 12:17





 


 Dextrose


  (Dextrose 50%)  25 ml  Q30M  PRN


 IV


 Hypoglycemia  10/31/20 00:30


 1/29/21 00:29   





 


 Dextrose


  (Dextrose 50%)  50 ml  Q30M  PRN


 IV


 Hypoglycemia  10/31/20 00:30


 1/29/21 00:29   





 


 Furosemide


  (Lasix)  20 mg  DAILY


 IV


   10/31/20 10:00


 11/30/20 09:59  11/3/20 09:10





 


 Insulin Aspart


  (NovoLOG)    BEFORE MEALS AND  HS


 SUBQ


   10/31/20 06:30


 1/29/21 06:29  11/3/20 12:19





 


 Insulin Aspart


  (NovoLOG)  6 units  NOVOTIAC


 SUBQ


   10/31/20 16:50


 1/29/21 16:49  11/4/20 05:46





 


 Insulin Detemir


  (Levemir)  18 units  DAILY


 SUBQ


   10/31/20 15:00


 1/29/21 14:59  11/3/20 09:14





 


 Lactulose


  (Cephulac)  20 gm  BID


 ORAL


   11/1/20 09:00


 12/1/20 08:59  11/3/20 17:18





 


 Morphine Sulfate


  (Morphine


 Sulfate)  2 mg  Q4H  PRN


 IVP


 For Pain  10/31/20 00:15


 11/7/20 00:14  11/2/20 21:09





 


 Pantoprazole


  (Protonix)  40 mg  EVERY 12  HOURS


 ORAL


   11/1/20 21:00


 12/1/20 20:59  11/3/20 20:04





 


 Spironolactone


  (Aldactone)  50 mg  DAILY


 ORAL


   10/31/20 10:00


 11/30/20 09:59  11/3/20 09:10














Assessment/Plan


Problem List:  


(1) COVID-19


ICD Codes:  U07.1 - COVID-19


SNOMED:  523788441


(2) Pneumonia due to COVID-19 virus


ICD Codes:  U07.1 - COVID-19; J12.89 - Other viral pneumonia


SNOMED:  725155283125930657


(3) Hyperglycemia


ICD Codes:  R73.9 - Hyperglycemia, unspecified


SNOMED:  34761233


Status:  progressing, unchanged


Assessment/Plan:


continue Levemir 18 units daily 


continue Novolog 6 units ac tid 


continue Novolog sliding scale ac / hs











Richardson Huber MD                  Nov 4, 2020 06:10

## 2020-11-04 NOTE — NUR
NURSE NOTES:

Received phone call from Dr. Gutierrez to refer patient to [facility] came from. CM 
is aware.

## 2020-11-04 NOTE — GENERAL PROGRESS NOTE
Subjective


ROS Limited/Unobtainable:  Yes


Allergies:  


Coded Allergies:  


     No Known Allergies (Unverified , 10/18/20)





Objective





Last 24 Hour Vital Signs








  Date Time  Temp Pulse Resp B/P (MAP) Pulse Ox O2 Delivery O2 Flow Rate FiO2


 


11/4/20 12:00 98.1 76 17 116/68 (84) 96   


 


11/4/20 09:00      Room Air  


 


11/4/20 08:00 98.2 79 18 127/62 (83) 96   


 


11/4/20 04:00 97.5 78 20 130/72 (91) 97   


 


11/4/20 00:00 97.9 80 20 129/79 (96) 98   


 


11/3/20 21:00      Room Air  


 


11/3/20 20:00 97.6 79 20 134/76 (95) 99   


 


11/3/20 16:00 99.3 76 18 122/81 (95) 98   

















Intake and Output  


 


 11/3/20 11/4/20





 19:00 07:00


 


Intake Total 1255 ml 900 ml


 


Balance 1255 ml 900 ml


 


  


 


Intake Oral 1200 ml 900 ml


 


IV Total 55 ml 


 


# Voids 5 3


 


# Bowel Movements 1 








Laboratory Tests


11/3/20 17:14: POC Whole Blood Glucose [Pending]


11/3/20 17:27: POC Whole Blood Glucose 92


11/3/20 20:04: POC Whole Blood Glucose 124H


11/4/20 08:24: POC Whole Blood Glucose [Pending]


11/4/20 11:44: POC Whole Blood Glucose 145H


Height (Feet):  5


Height (Inches):  5.00


Weight (Pounds):  250





Assessment/Plan


Problem List:  


(1) Pneumonia


ICD Codes:  J18.9 - Pneumonia, unspecified organism


SNOMED:  433204492


(2) Hyperglycemia


ICD Codes:  R73.9 - Hyperglycemia, unspecified


SNOMED:  41191141


(3) LGI bleed


ICD Codes:  K92.2 - Gastrointestinal hemorrhage, unspecified


SNOMED:  32655270


(4) Ascites due to alcoholic cirrhosis


ICD Codes:  K70.31 - Alcoholic cirrhosis of liver with ascites


SNOMED:  9257908990147943


(5) Pneumonia due to COVID-19 virus


ICD Codes:  U07.1 - COVID-19; J12.89 - Other viral pneumonia


SNOMED:  723455111313975562


(6) COVID-19


ICD Codes:  U07.1 - COVID-19


SNOMED:  460241377


Status:  progressing, unchanged


Assessment/Plan:


dc back to snf


afebrile


reviewed chart


covid positive 


cirrhosis


no sob











Chele Gutierrez MD                   Nov 4, 2020 13:07

## 2020-11-04 NOTE — NUR
NURSE HAND-OFF: 



Important Events on Shift:

Patient Status: STABLE

Diet: CCHO



Pending Orders: 

Pending Results/Labs:

Pending MD notification:



Latest Vital Signs: Temperature 97.5 , Pulse 78 , B/P 130 /72 , Respiratory Rate 20 , O2 SAT 
97 , Room Air, O2 Flow Rate .  

Vital Sign Comment: 



Latest Wood Fall Score: 20  

Fall Risk: Low Risk 

Safety Measures: Call light Within Reach, Bed Alarm Zone 1, Side Rails Side Rails x2, Bed 
position Low and Locked.

Fall Precautions: 

Patient Fall Education

## 2020-11-04 NOTE — NUR
NURSE NOTES:

Patient awake and alert and oriented,respirations unlabored.Patient sitting up ,ate 
breakfast.Saline lock left forearm intact. Patient state he is feeling good.Call light 
within reach.

## 2020-11-04 NOTE — INFECTIOUS DISEASES PROG NOTE
Assessment/Plan





Assessment/Plan:


34 yo male with with PMHx of DM and COVID 19 Dx 10/19/20 who presented to the ED

on 10/30/20 with blood in his stool and abdominal pain.





Leukocytosis- SP


Ascities-no evidence of SBP


    -11/2 sp paracentesis: yielding 1.25 liters of fluid


             -wbc 120 (N 5%)


  -CT abd/p:   There is moderate abdominal and pelvic ascites. Cirrhosis of the 

liver. Varices at the gastrohepatic ligament region consistent with underlying 

portal venous hypertension.  No other acute findings.


 





No fever


Abdominal Pain


COVID 19- assymptomatic


     Stable on RA


     SARS COV 2 PCR Pos 10/18/20 and 10/30/20


     CXR  10/30/20  Improved B/L Infiltrates





Elevated LFts; increased


  -HIV ab screen neg, Acute hep panel neg





DM


Cirrhosis  seen on CT





PLAN





- Cont Ceftriaxone #5/5 (empiric)


        -11/3 SP IV Vancomycin #4





- f/u Cx


- COVID 19 Iso for now- not candidate for Remdesivir according to [facility] [place] COVID task

force internal guidelines- need to be on supplemental O2





- Monitor Respiratory status





- f/u GI recs for GI Bleed





Thank you for this consult. [facility] will continue to follow Mr. Hilliard 

with you.





Subjective


Allergies:  


Coded Allergies:  


     No Known Allergies (Unverified , 10/18/20)





afebrile


at RA


leukocytosis resolved





Objective





Last 24 Hour Vital Signs








  Date Time  Temp Pulse Resp B/P (MAP) Pulse Ox O2 Delivery O2 Flow Rate FiO2


 


11/4/20 09:00      Room Air  


 


11/4/20 08:00 98.2 79 18 127/62 (83) 96   


 


11/4/20 04:00 97.5 78 20 130/72 (91) 97   


 


11/4/20 00:00 97.9 80 20 129/79 (96) 98   


 


11/3/20 21:00      Room Air  


 


11/3/20 20:00 97.6 79 20 134/76 (95) 99   


 


11/3/20 16:00 99.3 76 18 122/81 (95) 98   








Height (Feet):  5


Height (Inches):  5.00


Weight (Pounds):  250


GEN: NAD on RA


HEENT: NCAT, MMM, EOMI, No Scleral icterus, 


Neck: No LAD, Supple


LUNGS: CTAB, No W


HEART: RRR, S1, S2. 


ABD: Soft, NT, Mild Distension


Skin: No rash,, Normal in color


NEURO: A/O x 4, No focal deficits





Laboratory Tests








Test


 11/3/20


17:14 11/3/20


17:27 11/3/20


20:04 11/4/20


08:24


 


POC Whole Blood Glucose


 Pending  


 92 MG/DL


() 124 MG/DL


()  H Pending  





 


Test


 11/4/20


11:44 


 


 





 


POC Whole Blood Glucose


 145 MG/DL


()  H 


 


 














Current Medications








 Medications


  (Trade)  Dose


 Ordered  Sig/Mana


 Route


 PRN Reason  Start Time


 Stop Time Status Last Admin


Dose Admin


 


 Ceftriaxone


 Sodium 1 gm/


 Dextrose  55 ml @ 


 110 mls/hr  Q24H


 IVPB


   10/31/20 12:00


 11/7/20 11:59  11/4/20 11:47





 


 Dextrose


  (Dextrose 50%)  25 ml  Q30M  PRN


 IV


 Hypoglycemia  10/31/20 00:30


 1/29/21 00:29   





 


 Dextrose


  (Dextrose 50%)  50 ml  Q30M  PRN


 IV


 Hypoglycemia  10/31/20 00:30


 1/29/21 00:29   





 


 Furosemide


  (Lasix)  20 mg  DAILY


 IV


   10/31/20 10:00


 11/30/20 09:59  11/4/20 08:25





 


 Insulin Aspart


  (NovoLOG)    BEFORE MEALS AND  HS


 SUBQ


   10/31/20 06:30


 1/29/21 06:29  11/4/20 11:57





 


 Insulin Aspart


  (NovoLOG)  6 units  NOVOTIAC


 SUBQ


   10/31/20 16:50


 1/29/21 16:49  11/4/20 11:58





 


 Insulin Detemir


  (Levemir)  18 units  DAILY


 SUBQ


   10/31/20 15:00


 1/29/21 14:59  11/4/20 08:29





 


 Lactulose


  (Cephulac)  20 gm  BID


 ORAL


   11/1/20 09:00


 12/1/20 08:59  11/4/20 08:25





 


 Morphine Sulfate


  (Morphine


 Sulfate)  2 mg  Q4H  PRN


 IVP


 For Pain  10/31/20 00:15


 11/7/20 00:14  11/2/20 21:09





 


 Pantoprazole


  (Protonix)  40 mg  EVERY 12  HOURS


 ORAL


   11/1/20 21:00


 12/1/20 20:59  11/4/20 08:25





 


 Spironolactone


  (Aldactone)  50 mg  DAILY


 ORAL


   10/31/20 10:00


 11/30/20 09:59  11/4/20 08:25




















Sun Mishra M.D.             Nov 4, 2020 12:21

## 2020-11-04 NOTE — HEMATOLOGY/ONC PROGRESS NOTE
Assessment/Plan


Assessment/Plan





Assessment and Recs


# Thrombocytopenia - potential causes multifactorial, does endorse a hx of 

alcoholic cirrhosis


--> hiv and hep thus far neg


--> CT 2.  Cirrhosis of the liver. 3.  Varices at the gastrohepatic ligament 

region consistent with  underlying portal venous hypertension.


--> Peripheral smear ordered to evaluate for blasts /schistocytes 


--> abx and other meds have been reviewed 


--> ok for ppx if plt >50k w/ either heparin or lovenox 


--> Transfuse if Plt < 20k and fever, or if Plt < 10k without fever 


-> pl;t 83-->91-->87-->86


# Anemia that is due to LGI bleed


--> r'o variceal bleed


--> per gi endscopy prn


--> ppi as needed


--> hgb 14


# Leukocytosis is due to Bilateral pna with COVID-19


--> ABX vanc/ctx


--> smear is reviewed


# Hyperglycemia


-> iss and accuchecks as needed


# Ascites due to alcoholic cirrhosis


# Dvt ppx scds





The timing of this note does not necessarily reflect the time of the patient was

seen.





Greatly appreciate consultation.





Subjective


HEENT:  Denies: no symptoms, eye pain, blurred vision, tearing, double vision, 

ear pain, ear discharge, nose pain, nose congestion, throat pain, throat 

swelling, mouth pain, mouth swelling, other


Cardiovascular:  Denies: no symptoms, chest pain, edema, irregular heart rate, 

lightheadedness, palpitations, syncope, other


Genitourinary:  Denies: no symptoms, burning, discharge, frequency, flank pain, 

hematuria, incontinence, pain, urgency, other


Neurologic/Psychiatric:  Denies: no symptoms, anxiety, depressed, emotional 

problems, headache, numbness, paresthesia, pre-existing deficit, seizure, 

tingling, tremors, weakness, other


Endocrine:  Denies: no symptoms, excessive sweating, flushing, intolerance to 

cold, intolerance to heat, increased hunger, increased thirst, increased urine, 

unexplained weight gain, unexplained weight loss, other


Hematologic/Lymphatic:  Denies: no symptoms, anemia, easy bleeding, easy 

bruising, adenopathy, other


Allergies:  


Coded Allergies:  


     No Known Allergies (Unverified , 10/18/20)


Subjective


11/2 for paracentesis to be done, no bleeding, no night sweats


11/3 labs reviewed, meds noted, no night sweats, plt lower


11/4 labs noted, no bleeding, meds noted, no night sweats, plt 86





Objective


Objective





Current Medications








 Medications


  (Trade)  Dose


 Ordered  Sig/Mana


 Route


 PRN Reason  Start Time


 Stop Time Status Last Admin


Dose Admin


 


 Ceftriaxone


 Sodium 1 gm/


 Dextrose  55 ml @ 


 110 mls/hr  Q24H


 IVPB


   10/31/20 12:00


 11/7/20 11:59  11/3/20 12:17





 


 Dextrose


  (Dextrose 50%)  25 ml  Q30M  PRN


 IV


 Hypoglycemia  10/31/20 00:30


 1/29/21 00:29   





 


 Dextrose


  (Dextrose 50%)  50 ml  Q30M  PRN


 IV


 Hypoglycemia  10/31/20 00:30


 1/29/21 00:29   





 


 Furosemide


  (Lasix)  20 mg  DAILY


 IV


   10/31/20 10:00


 11/30/20 09:59  11/3/20 09:10





 


 Insulin Aspart


  (NovoLOG)    BEFORE MEALS AND  HS


 SUBQ


   10/31/20 06:30


 1/29/21 06:29  11/3/20 12:19





 


 Insulin Aspart


  (NovoLOG)  6 units  NOVOTIAC


 SUBQ


   10/31/20 16:50


 1/29/21 16:49  11/4/20 05:46





 


 Insulin Detemir


  (Levemir)  18 units  DAILY


 SUBQ


   10/31/20 15:00


 1/29/21 14:59  11/3/20 09:14





 


 Lactulose


  (Cephulac)  20 gm  BID


 ORAL


   11/1/20 09:00


 12/1/20 08:59  11/3/20 17:18





 


 Morphine Sulfate


  (Morphine


 Sulfate)  2 mg  Q4H  PRN


 IVP


 For Pain  10/31/20 00:15


 11/7/20 00:14  11/2/20 21:09





 


 Pantoprazole


  (Protonix)  40 mg  EVERY 12  HOURS


 ORAL


   11/1/20 21:00


 12/1/20 20:59  11/3/20 20:04





 


 Spironolactone


  (Aldactone)  50 mg  DAILY


 ORAL


   10/31/20 10:00


 11/30/20 09:59  11/3/20 09:10














Last 24 Hour Vital Signs








  Date Time  Temp Pulse Resp B/P (MAP) Pulse Ox O2 Delivery O2 Flow Rate FiO2


 


11/4/20 04:00 97.5 78 20 130/72 (91) 97   


 


11/4/20 00:00 97.9 80 20 129/79 (96) 98   


 


11/3/20 21:00      Room Air  


 


11/3/20 20:00 97.6 79 20 134/76 (95) 99   


 


11/3/20 16:00 99.3 76 18 122/81 (95) 98   


 


11/3/20 12:00 99.3 80 18 127/72 (90) 98   


 


11/3/20 09:00      Room Air  


 


11/3/20 08:00 98.2 80 18 125/79 (94) 97   


 


11/3/20 04:00 97.6 75 20 130/75 (93) 98   


 


11/3/20 00:00 97.9 79 20 135/76 (95) 97   


 


11/3/20 00:00 98.1 79 20 132/71 (91) 99   


 


11/2/20 21:39 97.5       


 


11/2/20 21:00      Room Air  


 


11/2/20 20:00 97.9 76 20 134/76 (95) 98   


 


11/2/20 15:48 97.5 71 18 145/72 (96) 96   


 


11/2/20 12:00 97.9 91 18 148/86 (106) 95   


 


11/2/20 08:21      Room Air  


 


11/2/20 07:56 97.7 88 20 150/97 (114) 95   

















Intake and Output  


 


 11/3/20 11/4/20





 19:00 07:00


 


Intake Total 1255 ml 900 ml


 


Balance 1255 ml 900 ml


 


  


 


Intake Oral 1200 ml 900 ml


 


IV Total 55 ml 


 


# Voids 5 3


 


# Bowel Movements 1 











Labs








Test


 11/1/20


11:57 11/1/20


12:40 11/1/20


17:00 11/1/20


21:04


 


Vancomycin Level Trough


 


 5.9 ug/mL


(5.0-12.0) 


 





 


Test


 11/2/20


04:00 11/2/20


05:35 11/2/20


11:22 11/2/20


13:00


 


White Blood Count


 9.8 K/UL


(4.8-10.8) 


 


 





 


Red Blood Count


 4.03 M/UL


(4.70-6.10) 


 


 





 


Hemoglobin


 14.0 G/DL


(14.2-18.0) 


 


 





 


Hematocrit


 42.0 %


(42.0-52.0) 


 


 





 


Mean Corpuscular Volume 104 FL (80-99)    


 


Mean Corpuscular Hemoglobin


 34.9 PG


(27.0-31.0) 


 


 





 


Mean Corpuscular Hemoglobin


Concent 33.5 G/DL


(32.0-36.0) 


 


 





 


Red Cell Distribution Width


 12.9 %


(11.6-14.8) 


 


 





 


Platelet Count


 86 K/UL


(150-450) 


 


 





 


Mean Platelet Volume


 11.9 FL


(6.5-10.1) 


 


 





 


Neutrophils (%) (Auto)  % (45.0-75.0)    


 


Lymphocytes (%) (Auto)  % (20.0-45.0)    


 


Monocytes (%) (Auto)  % (1.0-10.0)    


 


Eosinophils (%) (Auto)  % (0.0-3.0)    


 


Basophils (%) (Auto)  % (0.0-2.0)    


 


Differential Total Cells


Counted 100 


 


 


 





 


Neutrophils % (Manual) 65 % (45-75)    


 


Lymphocytes % (Manual) 27 % (20-45)    


 


Monocytes % (Manual) 6 % (1-10)    


 


Eosinophils % (Manual) 2 % (0-3)    


 


Basophils % (Manual) 0 % (0-2)    


 


Band Neutrophils 0 % (0-8)    


 


Platelet Estimate Decreased    


 


Platelet Morphology Normal    


 


Macrocytosis 1+    


 


Sodium Level


 141 MMOL/L


(136-145) 


 


 





 


Potassium Level


 4.2 MMOL/L


(3.5-5.1) 


 


 





 


Chloride Level


 109 MMOL/L


() 


 


 





 


Carbon Dioxide Level


 24 MMOL/L


(21-32) 


 


 





 


Anion Gap


 8 mmol/L


(5-15) 


 


 





 


Blood Urea Nitrogen 6 mg/dL (7-18)    


 


Creatinine


 0.8 MG/DL


(0.55-1.30) 


 


 





 


Estimat Glomerular Filtration


Rate > 60 mL/min


(>60) 


 


 





 


Glucose Level


 110 MG/DL


() 


 


 





 


Calcium Level


 8.3 MG/DL


(8.5-10.1) 


 


 





 


Total Bilirubin


 0.9 MG/DL


(0.2-1.0) 


 


 





 


Aspartate Amino Transf


(AST/SGOT) 101 U/L


(15-37) 


 


 





 


Alanine Aminotransferase


(ALT/SGPT) 118 U/L


(12-78) 


 


 





 


Alkaline Phosphatase


 198 U/L


() 


 


 





 


Ammonia


 44 umol/L


(11-32) 


 


 





 


Total Protein


 6.2 G/DL


(6.4-8.2) 


 


 





 


Albumin


 2.4 G/DL


(3.4-5.0) 


 


 





 


Globulin 3.8 g/dL    


 


Albumin/Globulin Ratio 0.6 (1.0-2.7)    


 


Vancomycin Level Trough


 


 


 


 11.2 ug/mL


(5.0-12.0)


 


Test


 11/2/20


14:10 11/2/20


16:41 11/3/20


09:13 11/3/20


12:16


 


Body Fluid Source Paracentesis    


 


Body Fluid Volume 3.0 mL    


 


Body Fluid Appearance


 Yellow/cloudy


(Clear) 


 


 





 


Body Fluid RBC 2488 /CUMM    


 


Body Fluid Total Nucleated


Cells 120 /CUMM 


 


 


 





 


Body Fluid Polynuclear WBCs


(%) 5 % 


 


 


 





 


Body Fluid Mononuclear WBCs


(%) 93 % 


 


 


 





 


Body Fluid Mesothelial Cells


(%) 2 % 


 


 


 





 


Body Fluid Comment Na    


 


POC Whole Blood Glucose


 


 


 165 MG/DL


() 201 MG/DL


()


 


Test


 11/3/20


17:14 11/3/20


17:27 11/3/20


20:04 





 


POC Whole Blood Glucose


 


 92 MG/DL


() 124 MG/DL


() 











Height (Feet):  5


Height (Inches):  5.00


Weight (Pounds):  250


Objective





Physical Exam


Sp02 EP Interpretation:  reviewed, normal


General:  no apparent distress


Head:  normocephalic, atraumatic


Eyes:  bilateral eye normal inspection, bilateral eye PERRL


ENT:  hearing grossly normal, normal pharynx, no angioedema, normal voice


Neck:  full range of motion, supple/symm/no masses


Respiratory:  chest non-tender, lungs clear, normal breath sounds, speaking full

sentences


Cardiovascular:  regular rate, rhythm, no edema


Gastrointestinal:  normal bowel sounds, ++ distended


Genitourinary:  normal inspection, no CVA tenderness


Neurologic:  alert, motor strength/tone normal, oriented x3, sensory intact, 

responsive, speech normal


Skin:  no rash


Lymphatic:  no adenopathy











Gary Mclean MD           Nov 4, 2020 06:55

## 2020-11-04 NOTE — NUR
NURSE NOTES:

Patient requested to be discharged to home, pt refused to go back to [facility]. 
Patient's family members called [facility] [place] spoke to CM, primary nurse. RN spoke to RANJANA Gutierrez and 
relayed. Dr. Gutierrez said ok to d/c home if ok with ID and RN also relayed patient's covid 
positive test was 10/18/20 per SNF records and also positive on 10/30/20 done [place] [place]. Charge 
RN spoke to Dr. Mishra who said patient is clear for d/c home with no home isolation since 
his first covid positive was more than 10 days and patient has no symptoms. Family asked to 
re swab the patient but Dr. Mishra declined. Endorsed to the primary nurse and family is 
aware.

## 2020-11-04 NOTE — NUR
NURSE NOTES:

Dr. Mishra discontinued ceftriaxone IVPB. RN clarified with Dr. Mishra if patient needs to 
continue with ceftriaxone. Dr. Mishra said to d/c.

## 2020-11-04 NOTE — NUR
NURSE NOTES:

Charge RN received discharge order from Dr. Gutierrez over the phone. Dr. Gutierrez wants to 
discharge patient to [facility]) for quarantine [phone], continue with 
hospital meds , d/c home meds, continue with ceftriaxone 1gm IVPB daily x 5 more days. RN 
endorsed to the primary nurse.

## 2020-11-05 NOTE — DISCHARGE SUMMARY
Discharge Summary


Discharge Summary


_


DATE OF ADMISSION: 10/30/2020





DATE OF DISCHARGE: 11/4/2020





DISCHARGED BY: Dr. Gutierrez





REASON FOR ADMISSION: 


35 years old male with past medical history of diabetes mellitus, recently 

diagnosed COVID infection ( 10/19/200) presented to emergency room for 

evaluation due to  blood in the stool.  


He reported dull,  7 out of 10 , nonradiating abdominal pain.  


He reported that his abdomen  was  distended.  


Patient came from the skilled nursing facility.  


Patient was recently admitted for COVID  infection  and subsequently discharged 

to facility for isolation. 


Patient denied  any fever or chills.  


No cough .


Upon evaluation blood pressure was 138/100,  pulse oximetry was 95% on room air,

no  fevers 


Laboratory work-up revealed WBC 12.9,  stable hemoglobin and hematocrit.  Pl

atelet count 83.


Glucose 504 , anion gap 4, potassium 3.9 ,sodium 134.  


AST 77,  .


Urinalysis revealed +4glucose , otherwise no evidence of UTI.  


CT scan of the abdomen and pelvis revealed moderate abdominal and pelvic 

ascites.  Cirrhosis of liver.  


Varices at the gastrohepatic ligament region consistent with underlying portal 

venous hypertension;  no other acute findings.  


Chest x-ray revealed interval improvement  : decrease in bilateral pneumonia , 

compared to the chest x-ray from 10/18.


Patient admitted for lower GI bleeding, hyperglycemia, ascites due to alcoholic 

cirrhosis and  history of COVID-19.








CONSULTANTS:


endocrinologist Dr. Huber


ID specialist Dr. Mishra 


hematologist/oncologist Dr. Mclean


GI specialist Dr Gomez  


 


[facility] [place] COU[facility]SE: 


Patient need to medical surgical floor isolation room.  


ID specialist followed.  Rapid COVID-19 in the emergency room was positive.  


Pulse oximetry  remained stable on room air.  


Patient completed 5 days of ceftriaxone.  


Patient was not a candidate of remdesivir according to [facility] 

Covid task force internal guidelines , since he was not on any supplemental 

oxygen.


Patient undergone on 11/2 paracentesis yielding 1.25 L of ascitic fluid.  WBC 

was only 120  ( 5%). 


No evidence of spontaneous bacterial peritonitis








Blood sugar was managed as per endocrinologist recommendation.


Patient was on long-acting Levemir , short acting premeal insulin 3 times a day 

premeal as well as the sliding scale of insulin as needed.  


Hypoglycemia protocol was on board as needed.  


Blood sugar improved . 


Hemoglobin A1c 8.9.  Patient will need to closely  follow-up with  his  primary 

care provider as an outpatient to bring blood sugar under control.  


Diabetic diet and  diabetic teaching provided.  


Patient was counseled on compliance with medication  and diet.   








Patient noted to have  thrombocytopenia, most likely due to cirrhosis of liver. 


Counts were closely monitored.  


Prior to discharge platelet count  86.


Hemoglobin and hematocrit were closely monitored with goal to keep hemoglobin 

above 7. 


LFT were closely monitored , remained mildly elevated, presumed due to COVID as 

per GI specialist. 


Outpatient monitoring of LFT was advised.


Hepatitis panel was negative . HIV test was nonreactive.  


Patient was counseled on abstinence from illicit street drugs.


Supportive care provided.  


Patient clinically stabilized and was ready for discharge home.





FINAL DIAGNOSES: 


COVID-19 infection 


s/p recent PNA


Elevated LFT


Diabetes mellitus with hyperglycemia


Cirrhosis


Ascites


Status post paracentesis


Thrombocytopenia


Anemia of chronic disease





DISCHARGE MEDICATIONS:


See Medication Reconciliation list.





DISCHARGE INSTRUCTIONS:


Patient was discharged home.


Follow-up With a primary care provider in 1 week.





I have been assigned to dictate discharge summary for this account. 


I was not involved in the patient's management.











Stephanie Dinero NP                 Nov 5, 2020 16:52